# Patient Record
Sex: FEMALE | Race: BLACK OR AFRICAN AMERICAN | NOT HISPANIC OR LATINO | Employment: FULL TIME | URBAN - METROPOLITAN AREA
[De-identification: names, ages, dates, MRNs, and addresses within clinical notes are randomized per-mention and may not be internally consistent; named-entity substitution may affect disease eponyms.]

---

## 2017-06-19 ENCOUNTER — PRENATAL OFFICE VISIT (OUTPATIENT)
Dept: NURSING | Facility: CLINIC | Age: 22
End: 2017-06-19
Payer: COMMERCIAL

## 2017-06-19 VITALS
TEMPERATURE: 98 F | BODY MASS INDEX: 31.54 KG/M2 | HEIGHT: 62 IN | WEIGHT: 171.4 LBS | SYSTOLIC BLOOD PRESSURE: 119 MMHG | HEART RATE: 90 BPM | DIASTOLIC BLOOD PRESSURE: 71 MMHG

## 2017-06-19 DIAGNOSIS — Z34.90 SUPERVISION OF NORMAL PREGNANCY: Primary | ICD-10-CM

## 2017-06-19 PROBLEM — Z23 NEED FOR TDAP VACCINATION: Status: ACTIVE | Noted: 2017-06-19

## 2017-06-19 LAB
ABO + RH BLD: NORMAL
ABO + RH BLD: NORMAL
ALBUMIN UR-MCNC: NEGATIVE MG/DL
APPEARANCE UR: CLEAR
BILIRUB UR QL STRIP: NEGATIVE
BLD GP AB SCN SERPL QL: NORMAL
BLOOD BANK CMNT PATIENT-IMP: NORMAL
COLOR UR AUTO: YELLOW
ERYTHROCYTE [DISTWIDTH] IN BLOOD BY AUTOMATED COUNT: 12.3 % (ref 10–15)
GLUCOSE UR STRIP-MCNC: NEGATIVE MG/DL
HCT VFR BLD AUTO: 38.1 % (ref 35–47)
HGB BLD-MCNC: 12.8 G/DL (ref 11.7–15.7)
HGB UR QL STRIP: NEGATIVE
KETONES UR STRIP-MCNC: NEGATIVE MG/DL
LEUKOCYTE ESTERASE UR QL STRIP: NEGATIVE
MCH RBC QN AUTO: 29.9 PG (ref 26.5–33)
MCHC RBC AUTO-ENTMCNC: 33.6 G/DL (ref 31.5–36.5)
MCV RBC AUTO: 89 FL (ref 78–100)
NITRATE UR QL: NEGATIVE
PH UR STRIP: 5.5 PH (ref 5–7)
PLATELET # BLD AUTO: 303 10E9/L (ref 150–450)
RBC # BLD AUTO: 4.28 10E12/L (ref 3.8–5.2)
SP GR UR STRIP: 1.02 (ref 1–1.03)
SPECIMEN EXP DATE BLD: NORMAL
URN SPEC COLLECT METH UR: NORMAL
UROBILINOGEN UR STRIP-ACNC: 0.2 EU/DL (ref 0.2–1)
WBC # BLD AUTO: 7.2 10E9/L (ref 4–11)

## 2017-06-19 PROCEDURE — 87086 URINE CULTURE/COLONY COUNT: CPT | Performed by: ADVANCED PRACTICE MIDWIFE

## 2017-06-19 PROCEDURE — 86592 SYPHILIS TEST NON-TREP QUAL: CPT | Performed by: ADVANCED PRACTICE MIDWIFE

## 2017-06-19 PROCEDURE — 85027 COMPLETE CBC AUTOMATED: CPT | Performed by: ADVANCED PRACTICE MIDWIFE

## 2017-06-19 PROCEDURE — 99207 ZZC NO CHARGE NURSE ONLY: CPT

## 2017-06-19 PROCEDURE — 86762 RUBELLA ANTIBODY: CPT | Performed by: ADVANCED PRACTICE MIDWIFE

## 2017-06-19 PROCEDURE — 87340 HEPATITIS B SURFACE AG IA: CPT | Performed by: ADVANCED PRACTICE MIDWIFE

## 2017-06-19 PROCEDURE — 86901 BLOOD TYPING SEROLOGIC RH(D): CPT | Performed by: ADVANCED PRACTICE MIDWIFE

## 2017-06-19 PROCEDURE — 87389 HIV-1 AG W/HIV-1&-2 AB AG IA: CPT | Performed by: ADVANCED PRACTICE MIDWIFE

## 2017-06-19 PROCEDURE — 36415 COLL VENOUS BLD VENIPUNCTURE: CPT | Performed by: ADVANCED PRACTICE MIDWIFE

## 2017-06-19 PROCEDURE — 86900 BLOOD TYPING SEROLOGIC ABO: CPT | Performed by: ADVANCED PRACTICE MIDWIFE

## 2017-06-19 PROCEDURE — 86780 TREPONEMA PALLIDUM: CPT | Performed by: ADVANCED PRACTICE MIDWIFE

## 2017-06-19 PROCEDURE — 86593 SYPHILIS TEST NON-TREP QUANT: CPT | Performed by: ADVANCED PRACTICE MIDWIFE

## 2017-06-19 PROCEDURE — 81003 URINALYSIS AUTO W/O SCOPE: CPT | Performed by: ADVANCED PRACTICE MIDWIFE

## 2017-06-19 PROCEDURE — 86850 RBC ANTIBODY SCREEN: CPT | Performed by: ADVANCED PRACTICE MIDWIFE

## 2017-06-19 RX ORDER — PRENATAL VIT/IRON FUM/FOLIC AC 27MG-0.8MG
1 TABLET ORAL DAILY
Qty: 100 TABLET | Refills: 3 | Status: SHIPPED | OUTPATIENT
Start: 2017-06-19 | End: 2019-04-02

## 2017-06-19 RX ORDER — PRENATAL VIT/IRON FUM/FOLIC AC 27MG-0.8MG
1 TABLET ORAL DAILY
COMMUNITY
End: 2019-04-02

## 2017-06-19 NOTE — MR AVS SNAPSHOT
"              After Visit Summary   6/19/2017    Roberto Caro    MRN: [id number]           Patient Information     Date Of Birth          1995        Visit Information        Provider Department      6/19/2017 12:45 PM RD OB NURSE EDUCATION [facility]        Today's Diagnoses     Supervision of normal pregnancy    -  1       Follow-ups after your visit        Your next 10 appointments already scheduled     Jul 14, 2017  1:00 PM CDT   New Prenatal with SUZI Juarez CNM   [facility] ([facility])    [address] [place] [id number] [phone]              Who to contact     If you have questions or need follow up information about today's clinic visit or your schedule please contact [facility] directly at [phone].  Normal or non-critical lab and imaging results will be communicated to you by IndoorAtlashart, letter or phone within 4 business days after the clinic has received the results. If you do not hear from us within 7 days, please contact the clinic through MyChart or phone. If you have a critical or abnormal lab result, we will notify you by phone as soon as possible.  Submit refill requests through Nusocket or call your pharmacy and they will forward the refill request to us. Please allow 3 business days for your refill to be completed.          Additional Information About Your Visit        MyChart Information     Nusocket lets you send messages to your doctor, view your test results, renew your prescriptions, schedule appointments and more. To sign up, go to [email or web address] . Click on \"Log in\" on the left side of the screen, which will take you to the Welcome page. Then click on \"Sign up Now\" on the right side of the page.     You will be asked to enter the access code listed below, as well as some personal information. Please follow the directions to create your username and password.   " "  Your access code is: [email or web address]  Expires: 2017 12:53 PM     Your access code will  in 90 days. If you need help or a new code, please call your [facility] or [phone].        Care EveryWhere ID     This is your Care EveryWhere ID. This could be used by other organizations to access your [place] medical records  [id number]        Your Vitals Were     Pulse Temperature Height Last Period BMI (Body Mass Index)       90 98  F (36.7  C) 5' 1.5\" (1.562 m) 2017 31.86 kg/m2        Blood Pressure from Last 3 Encounters:   17 119/71   16 126/85   02/15/16 132/77    Weight from Last 3 Encounters:   17 171 lb 6.4 oz (77.7 kg)   16 168 lb 1.6 oz (76.2 kg)   02/15/16 146 lb 1.6 oz (66.3 kg)              We Performed the Following     ABO/RH Type and Screen     Anti Treponema     CBC with Platelets     Hepatitis B surface antigen     HIV Antigen Antibody Combo     Rubella Antibody IgG Quantitative     UA without Microscopic     Urine Culture Aerobic Bacterial        Primary Care Provider    Physician No Ref-Primary       No address on file        Goals        General    Psychosocial (pt-stated)     Notes - Note created  2015  2:44 PM by Mago Dickey LICSW    As of today's date 2015 goal is met at 0 - 25%.   Goal Status:  Active      Pt will contact [facility] for parenting support classes/baby needs          Thank you!     Thank you for choosing [facility]  for your care. Our goal is always to provide you with excellent care. Hearing back from our patients is one way we can continue to improve our services. Please take a few minutes to complete the written survey that you may receive in the mail after your visit with us. Thank you!             Your Updated Medication List - Protect others around you: Learn how to safely use, store and throw away your medicines at www.disposemymeds.org.          This list is accurate as of: 17 12:53 PM.  Always " use your most recent med list.                   Brand Name Dispense Instructions for use    prenatal multivitamin  plus iron 27-0.8 MG Tabs per tablet      Take 1 tablet by mouth daily

## 2017-06-19 NOTE — PROGRESS NOTES
Patient presents for new ob teaching and labs, third pregnancy. History of SIIDS.Declined firsttrimester screening, handouts reviewed and given. Positive pregnancy test 6/14/17 at [facility]. Has appointment with [facility] 7/14/17.      Prenatal OB Questionnaire  Past Medical History  Diabetes   No  Hypertension   No  Heart Disease, mitral valve prolapse, or rheumatic fever?   No  An autoimmune disorder such as Lupus or Rheumatoid Arthritis?   No  Kidney Disease or Urinary Tract Infection?   No  Epilepsy, seizures or spells?   No  Migraine headaches?   No  A stroke or loss of function or sensation?   No  Any other neurological problems?   No  Have you ever been treated for depression?  No  Are you having problems with crying spells or loss of self-esteem?   No  Have you ever required psychiatric care?   No  Have you ever hepatitis, liver disease or jaundice?   No  Have you ever been treated for blood clots in your veins, deep venous thrombosis, inflammation in the veins, thrombosis, phlebitis, pulmonary embolism or varicosities?   No  Have you had excessive bleeding after surgery or dental work?   No  Do you bleed more than other women after a cut or scratch?   No  Do you have a history of anemia?   No  Have you ever been treated for thyroid problems or taken thyroid medication?  No  Do you have any other endocrine problems?  No  Have you ever been in a major accident or suffered serious trauma?   No  Within the last year, has anyone hit slapped, kicked or otherwise hurt you?  No  In the last year, has anyone forced you to have sex when you didn't want to?  No  Have you ever had a blood transfusion?   No  Would you refuse a blood transfusion if a doctor judged it to be medically necessary?   No  If you answered yes, would you rather die than have a blood transfusion?   No  If you answered yes, is this for Buddhist reasons?   No  Does anyone in your home smoke?   No  Do you use tobacco products?  No  Do you drink  beer, wine, hard liquor?  No  Do you use any of the following: marijuana, speed, cocaine, heroine, hallucinogens, or other drugs?  No  Is your blood type Rh negative?   No  Have you ever had abnormal antibodies in your blood?   No  Have you ever had asthma?   No  Have you ever had tuberculosis?   No  Do you have any allergies to drugs or over-the-counter medications?   No    Allergies as of 6/19/2017:    Allergies as of 06/19/2017     (No Known Allergies)       Do you have any breast problems?   No  Have you ever ?   Yes  Have you had any gynecological surgical procedures such as cervical conization, a LEEP procedure, laser treatment, cryosurgery of the cervix, or a dilation and curettage, etc?  No  Have you had any other surgical procedures?  No  Have you been hospitalized for a nonsurgical reason excluding normal delivery?   No  Have you ever had any anesthetic complications?   No  Have you ever had an abnormal pap smear?   No  Do you have a history of abnormalities of the uterus?   No  Did it take you more than one year to become pregnant?   No  Have you ever been evaluated or treated for infertility?   No  Is there a history of medical problems in your family, which you feel might adversely affect your health or pregnancy?   No  Do you have any other problems we have not asked you about which you feel may be important to this pregnancy?  No    Symptoms since Last Menstrual Period  Do you have any of the following:    *abdominal pain  No  *blood in stool or urine  No  *chest pain  No  *shortness of breath  No  *coughing or vomiting up blood No  *heart racing or skipping beats  No  *nausea and vomiting  No  *pain with urination  No  *vaginal discharge or bleeding  No  Current medications are:  Current Outpatient Prescriptions   Medication Sig Dispense Refill     Prenatal Vit-Fe Fumarate-FA (PRENATAL MULTIVITAMIN  PLUS IRON) 27-0.8 MG TABS per tablet Take 1 tablet by mouth daily         Genetic  Screening  At the time of birth, will you be 35 years old or older?  No  Has the patient, baby s father, or anyone in either family had:  Thalassemia (Italian, Greek, Mediterranean, or  background only) and an MCV result less than 80?  No  Neural tube defect such as meningomyelocele, spina bifida or anencephaly?  No  Congenital heart defect?  No  Down s syndrome?  No  Lee-Sach s disease (Gnosticist, Cajun, Syriac-Humacao)?  No  Sickle cell disease or trait (Griselda)?  No  Hemophilia or other inherited problems of blood coagulation? No  Muscular dystrophy?  No  Cystic Fibrosis?  No  Curt s chorea?  No  Mental retardation/autism? No   If yes, was the person tested for fragile X?  No  Any other inherited genetic or chromosomal disorder?  No  Maternal metabolic disorder (e.g. insulin-dependent diabetes, PKU)? No  A child with birth defects not listed above?  No  Recurrent pregnancy loss or a stillbirth?  Yes  Has the patient had any medications/street drugs/alcohol since her last menstrual period? No  Does the patient or baby s father have any other genetic risks?  No  Infection History  Do you object to being tested for Hepatitis B? No  Do you object to being tested for HIV? No  Do you feel that you are at high risk for coming in contact with the AIDS virus?  No  Have you ever been treated for tuberculosis?  No  Have you ever received the BCG vaccine for tuberculosis?  No  Have you ever had a positive skin test for tuberculosis? No  Do you live with someone who has tuberculosis?  No  Have you ever been exposed to tuberculosis?  No  Do you have genital herpes?  No  Does your partner have genital herpes?  No  Have you had a rash or viral illness since your last period?  No  Have you ever had Gonorrhea, Chlamydia, Syphilis, venereal warts, trichomoniasis, pelvic inflammatory disease or any other sexually transmitted disease?  No  Do you know if you are a genital group B streptococcus carrier? No  You have not had  chicken pox/varicella  No  Have you been vaccinated against chicken pox?  No  Have you had any other infectious disease? No        Early ultrasound screening tool:    Does patient have irregular periods?  No  Did patient use hormonal birth control in the three months prior to positive urine pregnancy test? No  Is the patient breastfeeding?  No  Is the patient 10 weeks or greater at time of education visit?  No

## 2017-06-20 ENCOUNTER — TELEPHONE (OUTPATIENT)
Dept: MIDWIFE SERVICES | Facility: CLINIC | Age: 22
End: 2017-06-20

## 2017-06-20 LAB
BACTERIA SPEC CULT: NORMAL
HBV SURFACE AG SERPL QL IA: NONREACTIVE
HIV 1+2 AB+HIV1 P24 AG SERPL QL IA: NORMAL
MICRO REPORT STATUS: NORMAL
RPR SER QL: ABNORMAL
RPR SER-TITR: 1 {TITER}
RUBV IGG SERPL IA-ACNC: 21 IU/ML
SPECIMEN SOURCE: NORMAL
T PALLIDUM IGG+IGM SER QL: ABNORMAL

## 2017-06-20 NOTE — TELEPHONE ENCOUNTER
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 RPR titer is 1. Routing to CNM.   Rubina Poole RN-BSN

## 2017-07-11 ENCOUNTER — PRENATAL OFFICE VISIT (OUTPATIENT)
Dept: MIDWIFE SERVICES | Facility: CLINIC | Age: 22
End: 2017-07-11
Payer: COMMERCIAL

## 2017-07-11 VITALS
TEMPERATURE: 98 F | DIASTOLIC BLOOD PRESSURE: 79 MMHG | HEART RATE: 84 BPM | SYSTOLIC BLOOD PRESSURE: 128 MMHG | WEIGHT: 166 LBS | BODY MASS INDEX: 30.86 KG/M2

## 2017-07-11 DIAGNOSIS — Z20.2 SYPHILIS CONTACT, TREATED: ICD-10-CM

## 2017-07-11 DIAGNOSIS — O03.9 MISCARRIAGE: Primary | ICD-10-CM

## 2017-07-11 LAB — B-HCG SERPL-ACNC: 2 IU/L (ref 0–5)

## 2017-07-11 PROCEDURE — 84702 CHORIONIC GONADOTROPIN TEST: CPT | Performed by: ADVANCED PRACTICE MIDWIFE

## 2017-07-11 PROCEDURE — 99213 OFFICE O/P EST LOW 20 MIN: CPT | Performed by: ADVANCED PRACTICE MIDWIFE

## 2017-07-11 PROCEDURE — 36415 COLL VENOUS BLD VENIPUNCTURE: CPT | Performed by: ADVANCED PRACTICE MIDWIFE

## 2017-07-11 NOTE — PROGRESS NOTES
S:  Roberto Caro is a  who presents today for evaluation after miscarriage for possible pregnancy again. Patient reports she was seen in the ER at [facility] on 17 and 2017. She was diagnosed with a miscarriage on the  after having her beta HCG levels trending down and ultrasound showing no gestational sac. Patient had bleeding which resolved around 2017. She reports she has had unprotected intercourse since then and is still having some pregnancy symptoms. She feels like there is a possibility she might be pregnant again. Here today to get another pregnancy test done. We discussed it might be hard to tell if the beta levels are still present from the miscarriage or just beginning to rise with a new pregnancy. When the results return we will come up with a plan of either returning for further testing or taking a home pregnancy test in a couple of weeks.   At her new OB visit she had an RPR redrawn. Her RPR return weakly reactive which was new from non-reactive before. Her titer was 1. Per problem list patient was given referral for infectious disease but it appears she has not followed up. Discussed with patient it would be good to have infectious disease review her chart since they are the experts but most likely she will not need treatment again unless her titers increase. Patient is agreeable to having infectious disease referral. Referral given today.   No other questions or concerns.     /79  Pulse 84  Temp 98  F (36.7  C) (Oral)  Wt 166 lb (75.3 kg)  LMP 2017  BMI 30.86 kg/m2  Patient appears well     A:  Follow up miscarriage, possible new pregnancy    P:  Will call patient with beta HCG results and come up with a plan pending results.     Yanet Márquez CNM

## 2017-07-11 NOTE — MR AVS SNAPSHOT
After Visit Summary   7/11/2017    Roberto Caro    MRN: [id number]           Patient Information     Date Of Birth          1995        Visit Information        Provider Department      7/11/2017 2:00 PM Yanet Márquez APRN CNM [facility]        Today's Diagnoses     Miscarriage    -  1    Syphilis contact, treated           Follow-ups after your visit        Additional Services     INFECTIOUS DISEASE REFERRAL       Your provider has referred you to: [facility]: Adult Specialty [facility] Infusion [facility] [phone]   [email or web address]    Please be aware that coverage of these services is subject to the terms and limitations of your health insurance plan.  Call member services at your health plan with any benefit or coverage questions.      Please bring the following with you to your appointment:    (1) Any X-Rays, CTs or MRIs which have been performed.  Contact the facility where they were done to arrange for  prior to your scheduled appointment.    (2) List of current medications   (3) This referral request   (4) Any documents/labs given to you for this referral                  Who to contact     If you have questions or need follow up information about today's clinic visit or your schedule please contact [facility] directly at [phone].  Normal or non-critical lab and imaging results will be communicated to you by MyChart, letter or phone within 4 business days after the clinic has received the results. If you do not hear from us within 7 days, please contact the clinic through MyChart or phone. If you have a critical or abnormal lab result, we will notify you by phone as soon as possible.  Submit refill requests through [employer] or call your pharmacy and they will forward the refill request to us. Please allow 3 business days for your refill to be completed.           "Additional Information About Your Visit        MyChart Information     Govtoday lets you send messages to your doctor, view your test results, renew your prescriptions, schedule appointments and more. To sign up, go to [email or web address] . Click on \"Log in\" on the left side of the screen, which will take you to the Welcome page. Then click on \"Sign up Now\" on the right side of the page.     You will be asked to enter the access code listed below, as well as some personal information. Please follow the directions to create your username and password.     Your access code is: [email or web address]  Expires: 2017 12:53 PM     Your access code will  in 90 days. If you need help or a new code, please call your [place] clinic or [phone].        Care EveryWhere ID     This is your Care EveryWhere ID. This could be used by other organizations to access your [place] medical records  [id number]        Your Vitals Were     Pulse Temperature Last Period BMI (Body Mass Index)          84 98  F (36.7  C) (Oral) 2017 30.86 kg/m2         Blood Pressure from Last 3 Encounters:   17 128/79   17 119/71   16 126/85    Weight from Last 3 Encounters:   17 166 lb (75.3 kg)   17 171 lb 6.4 oz (77.7 kg)   16 168 lb 1.6 oz (76.2 kg)              We Performed the Following     HCG, quantitative, pregnancy     INFECTIOUS DISEASE REFERRAL        Primary Care Provider    Physician No Ref-Primary       No address on file        Goals        General    Psychosocial (pt-stated)     Notes - Note created  2015  2:44 PM by Mago Dickey LICSW    As of today's date 2015 goal is met at 0 - 25%.   Goal Status:  Active      Pt will contact [facility] for parenting support classes/baby needs          Equal Access to Services     LAURITA STEPHENS AH: Madi Seals, froylan gil, crow kaalreginaldo prasad. So wa " [phone].    ATENCIÓN: Si landen gallardo, tiene a bob disposición servicios gratuitos de asistencia lingüística. Poonam al [phone].    We comply with applicable federal civil rights laws and [place] laws. We do not discriminate on the basis of race, color, national origin, age, disability sex, sexual orientation or gender identity.            Thank you!     Thank you for choosing [facility]  for your care. Our goal is always to provide you with excellent care. Hearing back from our patients is one way we can continue to improve our services. Please take a few minutes to complete the written survey that you may receive in the mail after your visit with us. Thank you!             Your Updated Medication List - Protect others around you: Learn how to safely use, store and throw away your medicines at www.disposemymeds.org.          This list is accurate as of: 7/11/17  2:47 PM.  Always use your most recent med list.                   Brand Name Dispense Instructions for use Diagnosis    * prenatal multivitamin  plus iron 27-0.8 MG Tabs per tablet      Take 1 tablet by mouth daily        * prenatal multivitamin  plus iron 27-0.8 MG Tabs per tablet     100 tablet    Take 1 tablet by mouth daily    Supervision of normal pregnancy       * Notice:  This list has 2 medication(s) that are the same as other medications prescribed for you. Read the directions carefully, and ask your doctor or other care provider to review them with you.

## 2017-07-11 NOTE — NURSING NOTE
"Chief Complaint   Patient presents with     Prenatal Care       Initial /79  Pulse 84  Temp 98  F (36.7  C) (Oral)  Wt 166 lb (75.3 kg)  LMP 2017  BMI 30.86 kg/m2 Estimated body mass index is 30.86 kg/(m^2) as calculated from the following:    Height as of 17: 5' 1.5\" (1.562 m).    Weight as of this encounter: 166 lb (75.3 kg).  BP completed using cuff size: regular        The following HM Due: NONE      The following patient reported/Care Every where data was sent to:  P ABSTRACT QUALITY INITIATIVES [94946]  na     n/a             "

## 2018-04-19 ENCOUNTER — TELEPHONE (OUTPATIENT)
Dept: TRANSPLANT | Facility: CLINIC | Age: 23
End: 2018-04-19

## 2018-04-19 DIAGNOSIS — Z00.5 TRANSPLANT DONOR EVALUATION: Primary | ICD-10-CM

## 2018-04-19 NOTE — TELEPHONE ENCOUNTER
"[facility] BREEZE  [id number]      LIVING KIDNEY DONOR EVALUATION  Donor First Name kiet Donor MRN    Donor Last Name ruben Completed 2018 5:08 PM    1995 Record ID [id number]   BREEZE Screen PASSED     Intended Recipient  Recipient First Name pema Recipient MRN    Recipient Last Name ruben Relationship Parent   Recipient  1976 Recipient Diagnosis    Recipient's ABO      Donor Information  Age 23 Gender Female   Height 5' 2'' Race Black or    Weight 167 Ethnicity Not /   BMI 30.6 Preferred Language English      Required No     Blood Type O   Demographics  Home Address [address] [place] [address] # [phone]   [facility] Type [facility] Alternate #    [facility] Code [address] Type    Country [place] Preferred Contact day Mon, Fri, Thur, Wed, Tue   Email [email or web address] Preferred Contact time 11:00 AM-1:00 PM, 1:00 PM-4:00 PM   Donor's Medical Information  Medical History History of miscarriage   History of pregnancy Medications None Reported   Surgical History Tonsillectomy Allergies NKDA   Social History EtOH: Rare (1-2 drinks/year)   Illicit Drug Use: Denies   Tobacco: Denies Self-Reported Functional Status \"I am able to climb 2 flights of stairs without stopping\"   Family Medical History Cancer (denies)   Diabetes (Aunt or Uncle, Grandparent)   Heart Disease (denies)   Hypertension (Mother, Grandparent, Aunt or Uncle)   Kidney Disease (denies)   Kidney Stones (denies) Exercise Frequency Exercise (Not on a regular basis)   Review of Organ Systems  Review of Systems Airway or Lungs: No   Blood Disorder: No   Cancer: No   Diabetes,Thyroid,Adrenal,Endocrine Disorder: No   Digestive or Liver: No   Female Health: Yes   Heart or Circulatory System: No   Immune Diseases: No   Kidneys and Bladder: No   Muscles,Bones,Joints: No   Neuro: No   Psych: No   Donor's Social Information  Marital Status Single Living Accommodation " Lives in rented accommodation   Level of Education Some college education Living Arrangement Other   Employment Status Full Time Concerns: health and life insurance No   Employer [employer] Concerns: job security and lost income No   Occupation      Medical Insurance Status No medical insurance     High Risk Behavior  High Risk Behaviors Blood transfusion < 12 months. (NO)   Commercial sex < 12 months. (NO)   Illicit IV drug use < 5yrs. (NO)   Other high risk sexual contact < 12 months. (NO)   Reason for Donation  Referral Friend or Family of [place]x Candidate Reason for Donation my dad need kidney I want help him   Permission to Disclose Inquiry Yes Patient Comments    Donor Motivation Level Highly motivated donor     PCP Contact  PCP Name    PCP [facility]    PCP [facility]   PCP Phone    Emergency Contact  First Name eunice First Name    Last Name suzie Last Name    Phone # [phone] Phone #    Phone Type Mobile Phone Type    Relationship Mother Relationship    Office Use  Reviewed By Lilian Douglass 4/19/2018 8:55 AM   Admin Folder Accept   Comments 4-19: Passed.   Lost for Followup    Extended Comments    BREEZE ID [email or web address]:[email or web address].[id number] survey status completed   Activity History  Call  Task    Due Date 4/19/2018   Last Modified Date/Time 4/19/2018 12:01 PM   Comments    Call  Task    Due Date 4/19/2018   Last Modified Date/Time 4/19/2018 11:39 AM   Comments

## 2019-04-02 ENCOUNTER — PRENATAL OFFICE VISIT (OUTPATIENT)
Dept: NURSING | Facility: CLINIC | Age: 24
End: 2019-04-02
Payer: COMMERCIAL

## 2019-04-02 VITALS
BODY MASS INDEX: 33.34 KG/M2 | HEIGHT: 62 IN | DIASTOLIC BLOOD PRESSURE: 78 MMHG | SYSTOLIC BLOOD PRESSURE: 124 MMHG | TEMPERATURE: 98 F | WEIGHT: 181.2 LBS | HEART RATE: 102 BPM

## 2019-04-02 DIAGNOSIS — Z34.90 SUPERVISION OF NORMAL PREGNANCY: Primary | ICD-10-CM

## 2019-04-02 LAB
ABO + RH BLD: NORMAL
ABO + RH BLD: NORMAL
ALBUMIN UR-MCNC: NEGATIVE MG/DL
APPEARANCE UR: CLEAR
B-HCG SERPL-ACNC: ABNORMAL IU/L (ref 0–5)
BILIRUB UR QL STRIP: NEGATIVE
BLD GP AB SCN SERPL QL: NORMAL
BLOOD BANK CMNT PATIENT-IMP: NORMAL
COLOR UR AUTO: YELLOW
ERYTHROCYTE [DISTWIDTH] IN BLOOD BY AUTOMATED COUNT: 13.2 % (ref 10–15)
GLUCOSE UR STRIP-MCNC: NEGATIVE MG/DL
HCG UR QL: POSITIVE
HCT VFR BLD AUTO: 39.3 % (ref 35–47)
HGB BLD-MCNC: 13.4 G/DL (ref 11.7–15.7)
HGB UR QL STRIP: NEGATIVE
KETONES UR STRIP-MCNC: NEGATIVE MG/DL
LEUKOCYTE ESTERASE UR QL STRIP: NEGATIVE
MCH RBC QN AUTO: 29.8 PG (ref 26.5–33)
MCHC RBC AUTO-ENTMCNC: 34.1 G/DL (ref 31.5–36.5)
MCV RBC AUTO: 88 FL (ref 78–100)
NITRATE UR QL: NEGATIVE
PH UR STRIP: 8 PH (ref 5–7)
PLATELET # BLD AUTO: 327 10E9/L (ref 150–450)
RBC # BLD AUTO: 4.49 10E12/L (ref 3.8–5.2)
SOURCE: ABNORMAL
SP GR UR STRIP: 1.01 (ref 1–1.03)
SPECIMEN EXP DATE BLD: NORMAL
UROBILINOGEN UR STRIP-ACNC: 0.2 EU/DL (ref 0.2–1)
WBC # BLD AUTO: 7.7 10E9/L (ref 4–11)

## 2019-04-02 PROCEDURE — 84702 CHORIONIC GONADOTROPIN TEST: CPT | Performed by: ADVANCED PRACTICE MIDWIFE

## 2019-04-02 PROCEDURE — 99207 ZZC NO CHARGE NURSE ONLY: CPT

## 2019-04-02 PROCEDURE — 86900 BLOOD TYPING SEROLOGIC ABO: CPT | Performed by: ADVANCED PRACTICE MIDWIFE

## 2019-04-02 PROCEDURE — 87086 URINE CULTURE/COLONY COUNT: CPT | Performed by: ADVANCED PRACTICE MIDWIFE

## 2019-04-02 PROCEDURE — 86762 RUBELLA ANTIBODY: CPT | Performed by: ADVANCED PRACTICE MIDWIFE

## 2019-04-02 PROCEDURE — 85027 COMPLETE CBC AUTOMATED: CPT | Performed by: ADVANCED PRACTICE MIDWIFE

## 2019-04-02 PROCEDURE — 81025 URINE PREGNANCY TEST: CPT | Performed by: ADVANCED PRACTICE MIDWIFE

## 2019-04-02 PROCEDURE — 87389 HIV-1 AG W/HIV-1&-2 AB AG IA: CPT | Performed by: ADVANCED PRACTICE MIDWIFE

## 2019-04-02 PROCEDURE — 86901 BLOOD TYPING SEROLOGIC RH(D): CPT | Performed by: ADVANCED PRACTICE MIDWIFE

## 2019-04-02 PROCEDURE — 36415 COLL VENOUS BLD VENIPUNCTURE: CPT | Performed by: ADVANCED PRACTICE MIDWIFE

## 2019-04-02 PROCEDURE — 87340 HEPATITIS B SURFACE AG IA: CPT | Performed by: ADVANCED PRACTICE MIDWIFE

## 2019-04-02 PROCEDURE — 86850 RBC ANTIBODY SCREEN: CPT | Performed by: ADVANCED PRACTICE MIDWIFE

## 2019-04-02 PROCEDURE — 0064U ANTB TP TOTAL&RPR IA QUAL: CPT | Performed by: ADVANCED PRACTICE MIDWIFE

## 2019-04-02 PROCEDURE — 86780 TREPONEMA PALLIDUM: CPT | Mod: 90 | Performed by: ADVANCED PRACTICE MIDWIFE

## 2019-04-02 PROCEDURE — 86592 SYPHILIS TEST NON-TREP QUAL: CPT | Performed by: ADVANCED PRACTICE MIDWIFE

## 2019-04-02 PROCEDURE — 99000 SPECIMEN HANDLING OFFICE-LAB: CPT | Performed by: ADVANCED PRACTICE MIDWIFE

## 2019-04-02 PROCEDURE — 81003 URINALYSIS AUTO W/O SCOPE: CPT | Performed by: ADVANCED PRACTICE MIDWIFE

## 2019-04-02 RX ORDER — PNV NO.95/FERROUS FUM/FOLIC AC 28MG-0.8MG
1 TABLET ORAL
COMMUNITY
Start: 2019-03-19

## 2019-04-02 SDOH — HEALTH STABILITY: PHYSICAL HEALTH: ON AVERAGE, HOW MANY DAYS PER WEEK DO YOU ENGAGE IN MODERATE TO STRENUOUS EXERCISE (LIKE A BRISK WALK)?: 0 DAYS

## 2019-04-02 SDOH — HEALTH STABILITY: PHYSICAL HEALTH: ON AVERAGE, HOW MANY MINUTES DO YOU ENGAGE IN EXERCISE AT THIS LEVEL?: 0 MIN

## 2019-04-02 SDOH — HEALTH STABILITY: MENTAL HEALTH
STRESS IS WHEN SOMEONE FEELS TENSE, NERVOUS, ANXIOUS, OR CAN'T SLEEP AT NIGHT BECAUSE THEIR MIND IS TROUBLED. HOW STRESSED ARE YOU?: NOT AT ALL

## 2019-04-02 ASSESSMENT — MIFFLIN-ST. JEOR: SCORE: 1517.23

## 2019-04-02 NOTE — PROGRESS NOTES
Important Information for Provider:       Patient presents for new ob teaching and labs, sixth pregnancy. History of TAB at 16 weeks 2018, 2 SAB's, first baby  at 1 month old of pneumonia/ SIIDS 11. She has one living child born 2016  with no complications. [place] [facility] [place], for cr[place], ultrasound was performed, too early. Blood work was done at that time. Repeat ultrasound scheduled 4/10/19 with appointment with CNM to review results. Handouts reviewed and given. Has NOB with CNM 19.  Patient has gained 20 lbs since ED visit on 3/19/19, dicussed diet and exercise. Need to double check weight at ultrasound appointment        Caffeine intake/servings daily - 2 pops daily, encouragaed more water intake  Calcium intake/servings daily - 3  Exercise 7 times weekly - describe ; walks daily  Sunscreen used - Yes  Seatbelts used - Yes  Guns stored in the home - No  Self Breast Exam - No  Pap test up to date -  Yes  Eye exam up to date -  Yes  Dental exam up to date -  Yes  DEXA scan up to date -  No  Flex Sig/Colonoscopy up to date -  No  Mammography up to date -  No  Immunizations reviewed and up to date - Yes  Abuse: Current or Past (Physical, Sexual or Emotional) - No  Do you feel safe in your environment - Yes  Do you cope well with stress - Yes  Do you suffer from insomnia - sometimes      Prenatal OB Questionnaire  Patient supplied answers from flow sheet for:  Prenatal OB Questionnaire.  Past Medical History  Diabetes?: No  Hypertension : No  Heart disease, mitral valve prolapse or rheumatic fever?: No  An autoimmune disease such as lupus or rheumatoid arthritis?: No  Kidney disease or urinary tract infection?: No  Epilepsy, seizures or spells?: No  Migraine headaches?: No  A stroke or loss of function or sensation?: No  Any other neurological problems?: No  Have you ever been treated for depression?: (!) Yes  Are you having problems with crying spells or loss  of self-esteem?: No  Have you ever required psychiatric care?: No  Have you ever had hepatitis, liver disease or jaundice?: No  Have you been treated for blood clots in your veins, deep vein thrombosis, inflammation in the veins, thrombosis, phlebitis, pulmonary embolism or varicosities?: No  Have you had excessive bleeding after surgery or dental work?: No  Do you bleed more than other women after a cut or scratch?: No  Do you have a history of anemia?: No  Have you ever had thyroid problems or taken thyroid medication?: No   Do you have any endocrine problems?: No  Have you ever been in a major accident or suffered serious trauma?: No  Within the last year, has anyone hit, slapped, kicked or otherwise hurt you?: No  In the last year, has anyone forced you to have sex when you didn't want to?: No    Past Medical History 2   Have you ever received a blood transfusion?: No  Would you refuse a blood transfusion if a doctor judged it to be medically necessary?: No   If you answered Yes, would you rather die than receive a blood transfusion?: No  If you answered Yes, is this for Latter-day reasons?: No  Does anyone in your home smoke?: No  Do you use tobacco products?: No  Do you drink beer, wine or hard liquor?: No  Do you use any of the following: marijuana, speed, cocaine, heroin, hallucinogens or other drugs?: No   Is your blood type Rh negative?: (!) Yes  Have you ever had abnormal antibodies in your blood?: No  Have you ever had asthma?: (!) Yes  Have you ever had tuberculosis?: No  Do you have any allergies to drugs or over-the-counter medications?: No  Allergies: Dust Mites, Aspartame, Ethanol, Venlafaxine, Hydrochloride, Sertraline: No  Have you had any breast problems?: No  Have you ever ?: (!) Yes  Have you had any gynecological surgical procedures such as cervical conization, a LEEP procedure, laser treatment, cryosurgery of the cervix or a dilation and curettage, etc?: No  Have you ever had any  other surgical procedures?: No  Have you been hospitalized for a nonsurgical reason excluding normal delivery?: No  Have you ever had any anesthetic complications?: No  Have you ever had an abnormal pap smear?: No    Past Medical History (Continued)  Do you have a history of abnormalities of the uterus?: No  Did your mother take SHAYLA or any other hormones when she was pregnant with you?: No  Did it take you more than a year to become pregnant?: No  Have you ever been evaluated or treated for infertility?: No  Is there a history of medical problems in your family, which you feel may be important to this pregnancy?: No  Do you have any other problems we have not asked about which you feel may be important to this pregnancy?: No    Symptoms since last menstrual period  Do you have any of the following symptoms: abdominal pain, blood in stools or urine, chest pain, shortness of breath, coughing or vomiting up blood, your heart racing or skipping beats, nausea and vomiting, pain on urination or vaginal discharge or bleed: (!) Yes  Will the patient be 35 years old or older at the time of delivery?: No    Has the patient, baby's father or anyone in either family had:  Thalassemia (Italian, Greek, Mediterranean or  background only) and an MCV result less than 80?: No  Neural tube defect such as meningomyelocele, spina bifida or anencephaly?: No  Congenital heart defect?: No  Down's Syndrome?: No  Lee-Sachs disease (Mormon, Cajun, Arabic-[place]and[place])?: No  Sickle cell disease or trait ()?: No  Hemophilia or other inherited problems of blood?: No  Muscular dystrophy?: No  Cystic fibrosis?: No  M[place]'s chorea?: No  Mental retardation/autism?: No  If yes, was the person tested for fragile X?: No  Any other inherited genetic or chromosomal disorder?: No  Maternal metabolic disorder (e.g Insulin-dependent diabetes, PKU)?: No  A child with birth defects not listed above?: No  Recurrent pregnancy loss or stillbirth?:  No   Has the patient had any medications/street drugs/alcohol since her last menstrual period?: No  Does the patient or baby's father have any other genetic risks?: No    Infection History   Do you object to being tested for Hepatitis B?: No  Do you object to being tested for HIV?: No   Do you feel that you are at high risk for coming in contact with the AIDS virus?: No  Have you ever been treated for tuberculosis?: No  Have you ever had a positive skin test for tuberculosis?: No  Do you live with someone who has tuberculosis?: No  Have you ever been exposed to tuberculosis?: No  Do you have genital herpes?: No  Does your partner have genital herpes?: No  Have you had a viral illness since your last period?: No  Have you ever had gonorrhea, chlamydia, syphilis, venereal warts, trichomoniasis, pelvic inflammatory disease or any other sexually transmitted disease?: (!) Yes  Do you know if you are a genital group B streptococcus carrier?: (!) Yes  Have you had chicken pox/varicella?: (!) Yes   Have you been vaccinated against chicken Pox?: (!) Yes  Have you had any other infectious diseases?: No      Allergies as of 4/2/2019:    Allergies as of 04/02/2019     (No Known Allergies)       Current medications are:  Current Outpatient Medications   Medication Sig Dispense Refill     Prenatal Vit-Fe Fumarate-FA (PRENATAL VITAMIN) 27-0.8 MG TABS Take 1 tablet by mouth           Early ultrasound screening tool:    Does patient have irregular periods?  No  Did patient use hormonal birth control in the three months prior to positive urine pregnancy test? No  Is the patient breastfeeding?  No  Is the patient 10 weeks or greater at time of education visit?  No

## 2019-04-03 ENCOUNTER — TELEPHONE (OUTPATIENT)
Dept: MIDWIFE SERVICES | Facility: CLINIC | Age: 24
End: 2019-04-03

## 2019-04-03 LAB
BACTERIA SPEC CULT: NORMAL
HBV SURFACE AG SERPL QL IA: NONREACTIVE
HIV 1+2 AB+HIV1 P24 AG SERPL QL IA: NONREACTIVE
RPR SER QL: NONREACTIVE
RUBV IGG SERPL IA-ACNC: 17 IU/ML
SPECIMEN SOURCE: NORMAL
T PALLIDUM AB SER QL: REACTIVE

## 2019-04-03 NOTE — TELEPHONE ENCOUNTER
Call from chem lab regarding patient's lab result. Treponema antibody is reactive, RPR is non-reactive. Routing to MB as FYI.   Rubina Poole RN-BSN

## 2019-04-06 LAB
RPR SER QL: NONREACTIVE
T PALLIDUM AB SER QL AGGL: REACTIVE

## 2019-04-10 ENCOUNTER — ANCILLARY PROCEDURE (OUTPATIENT)
Dept: ULTRASOUND IMAGING | Facility: CLINIC | Age: 24
End: 2019-04-10
Attending: ADVANCED PRACTICE MIDWIFE
Payer: COMMERCIAL

## 2019-04-10 ENCOUNTER — OFFICE VISIT (OUTPATIENT)
Dept: MIDWIFE SERVICES | Facility: CLINIC | Age: 24
End: 2019-04-10
Payer: COMMERCIAL

## 2019-04-10 VITALS
DIASTOLIC BLOOD PRESSURE: 76 MMHG | SYSTOLIC BLOOD PRESSURE: 132 MMHG | BODY MASS INDEX: 33.83 KG/M2 | TEMPERATURE: 98.8 F | WEIGHT: 182 LBS | HEART RATE: 94 BPM

## 2019-04-10 DIAGNOSIS — Z34.90 SUPERVISION OF NORMAL PREGNANCY: ICD-10-CM

## 2019-04-10 DIAGNOSIS — Z34.81 ENCOUNTER FOR SUPERVISION OF OTHER NORMAL PREGNANCY IN FIRST TRIMESTER: Primary | ICD-10-CM

## 2019-04-10 PROBLEM — Z34.80 NORMAL PREGNANCY IN MULTIGRAVIDA: Status: ACTIVE | Noted: 2019-04-10

## 2019-04-10 PROCEDURE — 76817 TRANSVAGINAL US OBSTETRIC: CPT | Performed by: OBSTETRICS & GYNECOLOGY

## 2019-04-10 PROCEDURE — 99212 OFFICE O/P EST SF 10 MIN: CPT | Performed by: ADVANCED PRACTICE MIDWIFE

## 2019-04-10 NOTE — PROGRESS NOTES
S:  Roberto Caro is a 24 year old  who presents today for follow up ultrasound. She has a history of miscarriage and was concerned about the pregnancy. Ultrasound today showed IUP at 7w4d. Her LMP was 2019 which is 5 days off from ultrasound. We discussed she is on the edge of changing EDC based on ultrasound. She feels okay about her dating but would prefer to use the ultrasound dating. Dating based on ultrasound. She has follow up scheduled for her new OB visit. No other questions or concerns today.   In her chart transplant is highlighted. She was seen by the transplant team to see if she could be a kidney donor for her father but she was not able to due to pregnancy. Her father is now on the kidney transplant list and waiting for a donor.     O:  /76   Pulse 94   Temp 98.8  F (37.1  C) (Oral)   Wt 82.6 kg (182 lb)   LMP 2019   BMI 33.83 kg/m    Doing well.     A:  IUP at 7w4d    P:  Follow up as scheduled for new OB visit. Already aware of her positive syphilis testing. RPR negative.     Yanet Márquez CNM

## 2019-04-24 ENCOUNTER — PRENATAL OFFICE VISIT (OUTPATIENT)
Dept: MIDWIFE SERVICES | Facility: CLINIC | Age: 24
End: 2019-04-24
Payer: COMMERCIAL

## 2019-04-24 VITALS
HEIGHT: 62 IN | BODY MASS INDEX: 33.69 KG/M2 | SYSTOLIC BLOOD PRESSURE: 138 MMHG | WEIGHT: 183.1 LBS | DIASTOLIC BLOOD PRESSURE: 83 MMHG | HEART RATE: 121 BPM | OXYGEN SATURATION: 98 %

## 2019-04-24 DIAGNOSIS — Z11.3 SCREENING FOR STD (SEXUALLY TRANSMITTED DISEASE): ICD-10-CM

## 2019-04-24 DIAGNOSIS — Z12.4 SCREENING FOR CERVICAL CANCER: Primary | ICD-10-CM

## 2019-04-24 DIAGNOSIS — Z67.41 TYPE O BLOOD, RH NEGATIVE: ICD-10-CM

## 2019-04-24 DIAGNOSIS — Z34.81 NORMAL PREGNANCY IN MULTIGRAVIDA IN FIRST TRIMESTER: ICD-10-CM

## 2019-04-24 DIAGNOSIS — B00.9 HSV (HERPES SIMPLEX VIRUS) INFECTION: ICD-10-CM

## 2019-04-24 PROCEDURE — 87591 N.GONORRHOEAE DNA AMP PROB: CPT | Performed by: ADVANCED PRACTICE MIDWIFE

## 2019-04-24 PROCEDURE — G0145 SCR C/V CYTO,THINLAYER,RESCR: HCPCS | Performed by: ADVANCED PRACTICE MIDWIFE

## 2019-04-24 PROCEDURE — 87491 CHLMYD TRACH DNA AMP PROBE: CPT | Performed by: ADVANCED PRACTICE MIDWIFE

## 2019-04-24 PROCEDURE — 99207 ZZC FIRST OB VISIT: CPT | Performed by: ADVANCED PRACTICE MIDWIFE

## 2019-04-24 RX ORDER — VALACYCLOVIR HYDROCHLORIDE 500 MG/1
500 TABLET, FILM COATED ORAL 2 TIMES DAILY
Qty: 6 TABLET | Refills: 3 | Status: SHIPPED | OUTPATIENT
Start: 2019-04-24 | End: 2019-10-22

## 2019-04-24 ASSESSMENT — MIFFLIN-ST. JEOR: SCORE: 1525.85

## 2019-04-24 NOTE — LETTER
April 30, 2019      Roberto Caro  [address]  [facility] [address]    Dear ,      I am happy to inform you that your recent cervical cancer screening test (PAP smear) was normal.      Preventative screenings such as this help to ensure your health for years to come. You should repeat a pap smear in 3 years, unless otherwise directed.      You will still need to return to the clinic every year for your annual exam and other preventive tests.     If you have additional questions regarding this result, please call our registered nurse, Pamella at [phone].      Sincerely,      SUZI Sheikh [facility]

## 2019-04-24 NOTE — PROGRESS NOTES
"9w4d  Est CNM patient.   FOB:  Alexander.  Discussed U/S and will change LORENA; 19.  Alexander 1st baby.  Pt nervous because of SAB last visit.   Has been diagnosed with HSV, discussed prophylasis the last month of pregnancy.  Pt informed that if  Outbreak of herpes occurs when delivering would mean a C/S. Pt ;breastfeed last baby for several months without diffculty.  FHT heard for short duration, bedsiide U/S for patient anxiety.  Pt has hx of syphilis and was adequately treated in . Watched titers with last pregnancy in  and titers did not go up, may need to do that again but will await test results. Reviewed warning signs of miscarriage, pt nervous because feels crampy sometimes, reviewed that normal.   rtc in 4 weeks but may return in 2 weeks to hear FHT\"s if nervous.  radames Caro is a  single   6 para 2 0 3 1  with a LMP of of 19 giving an EDC by ultrasound of 19 who presents for a new OB Visit.  The first positive pregnancy test was obtained on unknown .  Conception date is unknown.  She has not had bleeding since her LMP.  She has had mild nausea.. Weigh loss   has not occurred.. She denies fever, chills and viral infections since her last LMP.  She stopped taking  (medications) when she suspected pregnancy.  There is mildfatigue.  This was not a planned pregnancy.  She is a previous CNM patient.  FOB is Alexander, his first, their first together. He lives with her. .  =========================================    INFECTION HISTORY  HIV: no  Hepatitis B: no  Hepatitis C: no  Tuberculosis: no  Last PPD   Herpes self: yes  Herpes partner:  no and at risk  Chlamydia:  no and at risk  Gonorrhea:  no and at risk  HPV: no and at risk  BV:  no  Trichomonis:  no and at risk  Syphilis: yest treated in   Chicken Pox:  no  ============================================    PERSONAL/SOCIAL HISTORY  Lives lives with their family.  Employment: Full time.  Her job " involves moderate activity and long periods of standing with little potential for toxic exposure.  Additional items: None  =============================================    REVIEW OF SYSTEMS  CONSTITUTIONAL: Denies fever, chills and night sweats  DIET: Appetite is decrease.  Eats a regular.  Caffeine intake daily is 1-2.    Plans to gain 20 pounds with her pregnancy.  SKIN: Denies changes and suspicious moles or lesions.  ENT: Denies blurred vision, hearing loss, tinnitus, frequent colds, epistaxis, hoarseness and tooth pain.    ENDOCRINE: Denies heat and cold intolerance, and polydypsia.    BREASTS:  Intermittent tenderness since LMP.  Denies discharge and lumps.   HEART/LUNGS: Denies dyspnea, wheezing, coughing and chest pain.  HEMATOLOGIC: Denies tendency to bruise and history of blood clots.  GASTROINTESTINAL: Denies heartburn, indigestion and change of color in stools.    GENITOURINARY:  Denies urgency, dysuria and hematuria.  Has frequency of urination since LMP.   NEUROLOGIC:  Denies seizures, weakness and fainting.  PSYCHIATRIC:  Denies depression or anxiety  ===========================================    PHYSICAL EXAM  GENERAL:   pleasant pregnant female, alert, cooperative  and well groomed.  SKIN:  Warm and dry, without lesions or tenderness.    HEAD: Symmetrical features.  EYES:  PERRLA, wears no corective lenses.  EARS: Tympanic membranes gray, translucent and intact.  NOSE: No flaring, patent  MOUTH:  Buccal mucosa pink, moist without lesions.  Teeth in good repair.    NECK:  Thyroid without enlargement and nodules.  Lymph nodes not palpable.   LUNGS:  Clear to auscultation.  BREAST:  Symmetrical without lesions or nodes.  Nipples everted.  Areolas symmetric.  No palpable axillary nodes.  HEART:  RRR without murmur.  ABDOMEN: Soft without masses or tenderness.  No CVA tenderness.  Uterus palpable at size equal to dates.    .No scars noted..  MUSCULOSKELETAL:  Full range of motion  GENITALIA: Secondary  genital hair growth pattern is in a normal female distribution.  Bartholin and [place] glands without tenderness and inflammation.  Perineum without lesions.  VAGINA:  Pink, normal ruga and discharge.  CERVIX:  Anterior, smooth, without discharge or CMT and parous os, firm/ closed 4 cm long.  UTERUS: Anteverted, nontender 10 weeks in size.  ADNEXA: Without masses or tenderness  RECTAL:  Normal appearance.  Digital exam deferred.  PELVIS:  Arch; wide . Sacrum; deep. Spines;blunt.  Side walls; straight. Type; gynecoid  Pelvis proven to 7 pounds 4 ounces.  EXTREMITIES:  2+/2+ DTR, No edema. No significant varicosities.  ===================================================    PLAN:  Instructed on use of triage nurse line and contacting the on call CNM after hours in an emergency.    Discussed the indications, uses for and false positives for quad screen and fetal survey ultrasounds at 18-20 weeks gestation.  Will inform us at the next visit if she wished to avail herself of these screens.  Will return to the clinic in 4 weeks for her next routine prenatal check.  Will call to be seen sooner if problem arise.  Discussed the risks, benefits, side effects and alternative therapies for current prescribed and OTC medications.

## 2019-04-24 NOTE — LETTER
[facility]  [address] [place] [address]  [facility] [address]  [phone]      April 24, 2019      Roberto Caro  [address]  [facility] [address]              To Whom It May Concern:    Roberto Caro is being seen in our clinic for prenatal care.  Her Estimated Date of Delivery: Nov 23, 2019.  Ms. Caro should have 2- 15 minutes breaks and 1 - 30 minute break in an 8 hour shift.  Ms. Caro should not lift more than 25 lbs by herself starting now.    If any questions please call [phone].       Sincerely,        Geraldine Chandler CNM

## 2019-04-24 NOTE — PROGRESS NOTES
"Chief Complaint   Patient presents with     Prenatal Care     9+4.       Initial /83 (BP Location: Left arm, Patient Position: Sitting, Cuff Size: Adult Regular)   Pulse 121   Ht 1.562 m (5' 1.5\")   Wt 83.1 kg (183 lb 1.6 oz)   LMP 2019   SpO2 98%   BMI 34.04 kg/m   Estimated body mass index is 34.04 kg/m  as calculated from the following:    Height as of this encounter: 1.562 m (5' 1.5\").    Weight as of this encounter: 83.1 kg (183 lb 1.6 oz).  BP completed using cuff size: regular    Questioned patient about current smoking habits.  Pt. has never smoked.          The following HM Due: pap smear  Annalisa (13-24)      The following patient reported/Care Every where data was sent to:  P ABSTRACT QUALITY INITIATIVES [03917]  n/a      patient has appointment for today and orders have been placed              "

## 2019-04-25 LAB
C TRACH DNA SPEC QL NAA+PROBE: NEGATIVE
N GONORRHOEA DNA SPEC QL NAA+PROBE: NEGATIVE
SPECIMEN SOURCE: NORMAL
SPECIMEN SOURCE: NORMAL

## 2019-04-27 LAB
COPATH REPORT: NORMAL
PAP: NORMAL

## 2019-05-24 ENCOUNTER — PRENATAL OFFICE VISIT (OUTPATIENT)
Dept: MIDWIFE SERVICES | Facility: CLINIC | Age: 24
End: 2019-05-24
Payer: COMMERCIAL

## 2019-05-24 VITALS
OXYGEN SATURATION: 100 % | HEART RATE: 95 BPM | TEMPERATURE: 98.6 F | DIASTOLIC BLOOD PRESSURE: 68 MMHG | WEIGHT: 186.4 LBS | BODY MASS INDEX: 34.3 KG/M2 | SYSTOLIC BLOOD PRESSURE: 116 MMHG | HEIGHT: 62 IN

## 2019-05-24 DIAGNOSIS — B00.9 HSV (HERPES SIMPLEX VIRUS) INFECTION: ICD-10-CM

## 2019-05-24 DIAGNOSIS — Z67.41 TYPE O BLOOD, RH NEGATIVE: ICD-10-CM

## 2019-05-24 DIAGNOSIS — Z86.19 HISTORY OF SYPHILIS: ICD-10-CM

## 2019-05-24 DIAGNOSIS — Z34.81 NORMAL PREGNANCY IN MULTIGRAVIDA IN FIRST TRIMESTER: Primary | ICD-10-CM

## 2019-05-24 PROCEDURE — 99207 ZZC PRENATAL VISIT: CPT | Performed by: ADVANCED PRACTICE MIDWIFE

## 2019-05-24 ASSESSMENT — MIFFLIN-ST. JEOR: SCORE: 1540.81

## 2019-05-24 NOTE — PROGRESS NOTES
13w6d  Occasional vomiting maybe 2 times per week, very anxious to find out sex of baby.  Upset that has to wait 5 weeks, next visit will be for fetal survey.  Warning signs reviewed.  Should have RPR titer next visit.  rtc in 5 weeks radames

## 2019-06-26 ENCOUNTER — ANCILLARY PROCEDURE (OUTPATIENT)
Dept: ULTRASOUND IMAGING | Facility: CLINIC | Age: 24
End: 2019-06-26
Attending: ADVANCED PRACTICE MIDWIFE
Payer: COMMERCIAL

## 2019-06-26 ENCOUNTER — PRENATAL OFFICE VISIT (OUTPATIENT)
Dept: MIDWIFE SERVICES | Facility: CLINIC | Age: 24
End: 2019-06-26
Attending: ADVANCED PRACTICE MIDWIFE
Payer: COMMERCIAL

## 2019-06-26 VITALS
BODY MASS INDEX: 34.78 KG/M2 | HEIGHT: 62 IN | SYSTOLIC BLOOD PRESSURE: 135 MMHG | HEART RATE: 94 BPM | DIASTOLIC BLOOD PRESSURE: 79 MMHG | WEIGHT: 189 LBS | OXYGEN SATURATION: 96 %

## 2019-06-26 DIAGNOSIS — Z34.81 NORMAL PREGNANCY IN MULTIGRAVIDA IN FIRST TRIMESTER: ICD-10-CM

## 2019-06-26 DIAGNOSIS — Z34.82 ENCOUNTER FOR SUPERVISION OF OTHER NORMAL PREGNANCY, SECOND TRIMESTER: Primary | ICD-10-CM

## 2019-06-26 PROCEDURE — 76805 OB US >/= 14 WKS SNGL FETUS: CPT | Performed by: OBSTETRICS & GYNECOLOGY

## 2019-06-26 PROCEDURE — 99207 ZZC PRENATAL VISIT: CPT | Performed by: ADVANCED PRACTICE MIDWIFE

## 2019-06-26 ASSESSMENT — MIFFLIN-ST. JEOR: SCORE: 1552.61

## 2019-06-27 NOTE — PROGRESS NOTES
Pt here after ultrasound, feeling well, no concerns. Has gender in an envelope because partner unable to be here today so will find out when he gets home from work. Starting to feel small movements. No cramping, LOF or bleeding. History of syphilis, check titers with GCT and hgb at next visit in 6 weeks. MML

## 2019-08-08 ENCOUNTER — PRENATAL OFFICE VISIT (OUTPATIENT)
Dept: MIDWIFE SERVICES | Facility: CLINIC | Age: 24
End: 2019-08-08
Payer: COMMERCIAL

## 2019-08-08 VITALS
OXYGEN SATURATION: 99 % | SYSTOLIC BLOOD PRESSURE: 109 MMHG | BODY MASS INDEX: 35.32 KG/M2 | DIASTOLIC BLOOD PRESSURE: 70 MMHG | WEIGHT: 190 LBS | HEART RATE: 91 BPM

## 2019-08-08 DIAGNOSIS — Z34.82 ENCOUNTER FOR SUPERVISION OF OTHER NORMAL PREGNANCY, SECOND TRIMESTER: ICD-10-CM

## 2019-08-08 DIAGNOSIS — Z86.19 HISTORY OF SYPHILIS: ICD-10-CM

## 2019-08-08 DIAGNOSIS — Z67.41 TYPE O BLOOD, RH NEGATIVE: ICD-10-CM

## 2019-08-08 DIAGNOSIS — B00.9 HSV (HERPES SIMPLEX VIRUS) INFECTION: ICD-10-CM

## 2019-08-08 LAB
BLD GP AB SCN SERPL QL: NORMAL
GLUCOSE 1H P 50 G GLC PO SERPL-MCNC: 75 MG/DL (ref 60–129)
HGB BLD-MCNC: 11.4 G/DL (ref 11.7–15.7)

## 2019-08-08 PROCEDURE — 99000 SPECIMEN HANDLING OFFICE-LAB: CPT | Performed by: ADVANCED PRACTICE MIDWIFE

## 2019-08-08 PROCEDURE — 99207 ZZC PRENATAL VISIT: CPT | Performed by: ADVANCED PRACTICE MIDWIFE

## 2019-08-08 PROCEDURE — 86780 TREPONEMA PALLIDUM: CPT | Performed by: ADVANCED PRACTICE MIDWIFE

## 2019-08-08 PROCEDURE — 36415 COLL VENOUS BLD VENIPUNCTURE: CPT | Performed by: ADVANCED PRACTICE MIDWIFE

## 2019-08-08 PROCEDURE — 86592 SYPHILIS TEST NON-TREP QUAL: CPT | Performed by: ADVANCED PRACTICE MIDWIFE

## 2019-08-08 PROCEDURE — 82950 GLUCOSE TEST: CPT | Performed by: ADVANCED PRACTICE MIDWIFE

## 2019-08-08 PROCEDURE — 86850 RBC ANTIBODY SCREEN: CPT | Performed by: ADVANCED PRACTICE MIDWIFE

## 2019-08-08 PROCEDURE — 00000218 ZZHCL STATISTIC OBHBG - HEMOGLOBIN: Performed by: ADVANCED PRACTICE MIDWIFE

## 2019-08-08 PROCEDURE — 86780 TREPONEMA PALLIDUM: CPT | Mod: 90 | Performed by: ADVANCED PRACTICE MIDWIFE

## 2019-08-08 NOTE — PROGRESS NOTES
24w5d  1 hour GCT today.   Passed at 75.  Good questions about fetal growth.   Having a 'boy'   rtc in 4 weeks PTL reviewed. radames

## 2019-08-09 ENCOUNTER — TELEPHONE (OUTPATIENT)
Dept: MIDWIFE SERVICES | Facility: CLINIC | Age: 24
End: 2019-08-09

## 2019-08-09 LAB
RPR SER QL: NONREACTIVE
T PALLIDUM AB SER QL: REACTIVE

## 2019-08-09 NOTE — TELEPHONE ENCOUNTER
Specialty lab called regarding positive treponema antibodies. Routing to MB as FYI.   Rubina Poole, SAMUEL-BSN

## 2019-08-11 LAB
RPR SER QL: NONREACTIVE
T PALLIDUM AB SER QL AGGL: REACTIVE

## 2019-09-06 ENCOUNTER — PRENATAL OFFICE VISIT (OUTPATIENT)
Dept: MIDWIFE SERVICES | Facility: CLINIC | Age: 24
End: 2019-09-06
Payer: COMMERCIAL

## 2019-09-06 VITALS
HEART RATE: 112 BPM | WEIGHT: 189 LBS | SYSTOLIC BLOOD PRESSURE: 131 MMHG | DIASTOLIC BLOOD PRESSURE: 84 MMHG | BODY MASS INDEX: 35.13 KG/M2

## 2019-09-06 DIAGNOSIS — Z23 NEED FOR TDAP VACCINATION: ICD-10-CM

## 2019-09-06 DIAGNOSIS — Z67.41 TYPE O BLOOD, RH NEGATIVE: ICD-10-CM

## 2019-09-06 DIAGNOSIS — Z34.82 ENCOUNTER FOR SUPERVISION OF OTHER NORMAL PREGNANCY, SECOND TRIMESTER: Primary | ICD-10-CM

## 2019-09-06 PROCEDURE — 90715 TDAP VACCINE 7 YRS/> IM: CPT | Performed by: ADVANCED PRACTICE MIDWIFE

## 2019-09-06 PROCEDURE — 90471 IMMUNIZATION ADMIN: CPT | Performed by: ADVANCED PRACTICE MIDWIFE

## 2019-09-06 PROCEDURE — 99207 ZZC PRENATAL VISIT: CPT | Performed by: ADVANCED PRACTICE MIDWIFE

## 2019-09-06 NOTE — PROGRESS NOTES
28w6d  Roberto is here by herself today. Discussed Rhogam for Rh-, done today. Also had Tdap today. Baby is active, denies bleeding, leaking of fluid, contractions. Has had some headaches, but denies vision changes. Headaches go away with water/rest/tylenol. Gave her handout for meds that are OK in pregnancy, asking if she can take Nyquil. Discussed danger signs and when to call CNM. Return to care at 32 weeks.  Pascual Corona CNM

## 2019-10-04 ENCOUNTER — PRENATAL OFFICE VISIT (OUTPATIENT)
Dept: MIDWIFE SERVICES | Facility: CLINIC | Age: 24
End: 2019-10-04
Payer: COMMERCIAL

## 2019-10-04 VITALS
HEART RATE: 95 BPM | HEIGHT: 62 IN | WEIGHT: 190.6 LBS | DIASTOLIC BLOOD PRESSURE: 79 MMHG | BODY MASS INDEX: 35.07 KG/M2 | SYSTOLIC BLOOD PRESSURE: 134 MMHG | OXYGEN SATURATION: 96 %

## 2019-10-04 DIAGNOSIS — Z34.83 PRENATAL CARE, SUBSEQUENT PREGNANCY IN THIRD TRIMESTER: Primary | ICD-10-CM

## 2019-10-04 PROCEDURE — 99207 ZZC PRENATAL VISIT: CPT | Performed by: ADVANCED PRACTICE MIDWIFE

## 2019-10-04 ASSESSMENT — MIFFLIN-ST. JEOR: SCORE: 1559.87

## 2019-10-04 ASSESSMENT — PATIENT HEALTH QUESTIONNAIRE - PHQ9: SUM OF ALL RESPONSES TO PHQ QUESTIONS 1-9: 1

## 2019-10-04 NOTE — PROGRESS NOTES
Roberto is here for a visit.  Is having some lower back pains that appear to be SI joint related.  Discussed using Cat/Cow pose to release the joint.  Denies ctx.  Active baby.  Offered flu shot but wants to wait as she got Rhogam and Tdap last visit.   ASSESSMENT: 32w6d   PLAN: RTC in 2 wks for PNV and then weekly.    PIERRE

## 2019-10-22 ENCOUNTER — PRENATAL OFFICE VISIT (OUTPATIENT)
Dept: MIDWIFE SERVICES | Facility: CLINIC | Age: 24
End: 2019-10-22
Payer: COMMERCIAL

## 2019-10-22 VITALS — WEIGHT: 192.2 LBS | DIASTOLIC BLOOD PRESSURE: 80 MMHG | SYSTOLIC BLOOD PRESSURE: 114 MMHG | BODY MASS INDEX: 35.73 KG/M2

## 2019-10-22 DIAGNOSIS — Z23 NEED FOR PROPHYLACTIC VACCINATION AND INOCULATION AGAINST INFLUENZA: ICD-10-CM

## 2019-10-22 DIAGNOSIS — Z86.19 HISTORY OF HERPES SIMPLEX INFECTION: ICD-10-CM

## 2019-10-22 DIAGNOSIS — Z34.83 ENCOUNTER FOR SUPERVISION OF OTHER NORMAL PREGNANCY, THIRD TRIMESTER: Primary | ICD-10-CM

## 2019-10-22 LAB — HGB BLD-MCNC: 12.7 G/DL (ref 11.7–15.7)

## 2019-10-22 PROCEDURE — 87653 STREP B DNA AMP PROBE: CPT | Performed by: ADVANCED PRACTICE MIDWIFE

## 2019-10-22 PROCEDURE — 99207 ZZC PRENATAL VISIT: CPT | Performed by: ADVANCED PRACTICE MIDWIFE

## 2019-10-22 PROCEDURE — 90471 IMMUNIZATION ADMIN: CPT | Performed by: ADVANCED PRACTICE MIDWIFE

## 2019-10-22 PROCEDURE — 36416 COLLJ CAPILLARY BLOOD SPEC: CPT | Performed by: ADVANCED PRACTICE MIDWIFE

## 2019-10-22 PROCEDURE — 90686 IIV4 VACC NO PRSV 0.5 ML IM: CPT | Performed by: ADVANCED PRACTICE MIDWIFE

## 2019-10-22 PROCEDURE — 00000218 ZZHCL STATISTIC OBHBG - HEMOGLOBIN: Performed by: ADVANCED PRACTICE MIDWIFE

## 2019-10-22 RX ORDER — ACYCLOVIR 400 MG/1
400 TABLET ORAL EVERY 8 HOURS
Qty: 105 TABLET | Refills: 0 | Status: ON HOLD | OUTPATIENT
Start: 2019-10-22 | End: 2019-10-28

## 2019-10-22 NOTE — NURSING NOTE
Clinic Administered Medication Documentation    MEDICATION LIST:   Injectable Medication Documentation    Patient was given flu shot. Prior to medication administration, verified patients identity using patient s name and date of birth. Please see MAR and medication order for additional information. Patient instructed to remain in clinic for 15 minutes.      Was entire vial of medication used? Yes  Vial/Syringe: Single dose vial  Expiration Date:  6/30/2020  Was this medication supplied by the patient? No

## 2019-10-23 LAB
GP B STREP DNA SPEC QL NAA+PROBE: NEGATIVE
SPECIMEN SOURCE: NORMAL

## 2019-10-26 ENCOUNTER — NURSE TRIAGE (OUTPATIENT)
Dept: NURSING | Facility: CLINIC | Age: 24
End: 2019-10-26

## 2019-10-26 ENCOUNTER — TELEPHONE (OUTPATIENT)
Dept: MIDWIFE SERVICES | Facility: CLINIC | Age: 24
End: 2019-10-26

## 2019-10-26 NOTE — TELEPHONE ENCOUNTER
Caller states she is pregnant and LORENA is 19. Caller states she went to the toilet and when she wiped, there was pinkish discharge on toilet paper. Triage guidelines recommend to call provider immediately. Triager called out to answering service, left message for on call midwife Marjorie to call patient at [phone]. Caller advised to call back if no return call within 20 minutes. Caller verbalized and understands directives.    Reason for Disposition    [1] Pregnant 24-36 weeks () AND [2] pinkish or brownish mucous discharge    Additional Information    Negative: [1] Vaginal bleeding AND [2] pregnant > 20 weeks    Negative: [1] Vaginal bleeding AND [2] pregnant < 20 weeks    Negative: [1] Having contractions or other symptoms of labor AND [2] < 37 weeks pregnant (i.e., )    Negative: [1] Having contractions or other symptoms of labor AND [2] > 36 weeks pregnant (i.e., term pregnancy)    Negative: Leakage of fluid (trickle, gush) from the vagina    Negative: Foreign body in vagina (e.g., tampon)    Negative: Pain or burning with urination is main symptom    Negative: [1] Pregnant 23 or more weeks AND [2] baby is moving less today (e.g., kick count < 5 in 1 hour or < 10 in 2 hours)    Negative: Patient sounds very sick or weak to the triager    Negative: [1] Constant abdominal pain AND [2] present > 2 hours    Negative: [1] Intermittent lower abdominal pain AND [2] present > 24 hours    Protocols used: PREGNANCY - VAGINAL [id number]-V-AW

## 2019-10-27 ENCOUNTER — NURSE TRIAGE (OUTPATIENT)
Dept: NURSING | Facility: CLINIC | Age: 24
End: 2019-10-27

## 2019-10-27 ENCOUNTER — HOSPITAL ENCOUNTER (INPATIENT)
Facility: CLINIC | Age: 24
LOS: 2 days | Discharge: HOME HEALTH | End: 2019-10-29
Attending: ADVANCED PRACTICE MIDWIFE | Admitting: ADVANCED PRACTICE MIDWIFE
Payer: COMMERCIAL

## 2019-10-27 DIAGNOSIS — F81.9 LEARNING DISABILITY: ICD-10-CM

## 2019-10-27 LAB
ABO + RH BLD: ABNORMAL
ABO + RH BLD: ABNORMAL
AMPHETAMINES UR QL SCN: NEGATIVE
BLD GP AB INVEST PLASRBC-IMP: ABNORMAL
BLD GP AB SCN SERPL QL: ABNORMAL
BLOOD BANK CMNT PATIENT-IMP: ABNORMAL
BLOOD BANK CMNT PATIENT-IMP: ABNORMAL
BLOOD BANK CMNT PATIENT-IMP: NORMAL
CANNABINOIDS UR QL: NEGATIVE
COCAINE UR QL: NEGATIVE
OPIATES UR QL SCN: NEGATIVE
PCP UR QL SCN: NEGATIVE
SPECIMEN EXP DATE BLD: ABNORMAL

## 2019-10-27 PROCEDURE — 86900 BLOOD TYPING SEROLOGIC ABO: CPT | Performed by: ADVANCED PRACTICE MIDWIFE

## 2019-10-27 PROCEDURE — 86870 RBC ANTIBODY IDENTIFICATION: CPT | Performed by: ADVANCED PRACTICE MIDWIFE

## 2019-10-27 PROCEDURE — 36415 COLL VENOUS BLD VENIPUNCTURE: CPT | Performed by: ADVANCED PRACTICE MIDWIFE

## 2019-10-27 PROCEDURE — 25000128 H RX IP 250 OP 636

## 2019-10-27 PROCEDURE — 72200001 ZZH LABOR CARE VAGINAL DELIVERY SINGLE

## 2019-10-27 PROCEDURE — 85461 HEMOGLOBIN FETAL: CPT | Performed by: ADVANCED PRACTICE MIDWIFE

## 2019-10-27 PROCEDURE — 86901 BLOOD TYPING SEROLOGIC RH(D): CPT | Performed by: ADVANCED PRACTICE MIDWIFE

## 2019-10-27 PROCEDURE — 12000001 ZZH R&B MED SURG/OB UMMC

## 2019-10-27 PROCEDURE — G0463 HOSPITAL OUTPT CLINIC VISIT: HCPCS

## 2019-10-27 PROCEDURE — 86850 RBC ANTIBODY SCREEN: CPT | Performed by: ADVANCED PRACTICE MIDWIFE

## 2019-10-27 PROCEDURE — 10907ZC DRAINAGE OF AMNIOTIC FLUID, THERAPEUTIC FROM PRODUCTS OF CONCEPTION, VIA NATURAL OR ARTIFICIAL OPENING: ICD-10-PCS | Performed by: ADVANCED PRACTICE MIDWIFE

## 2019-10-27 PROCEDURE — 59400 OBSTETRICAL CARE: CPT | Performed by: ADVANCED PRACTICE MIDWIFE

## 2019-10-27 PROCEDURE — 80307 DRUG TEST PRSMV CHEM ANLYZR: CPT | Performed by: ADVANCED PRACTICE MIDWIFE

## 2019-10-27 PROCEDURE — 25000132 ZZH RX MED GY IP 250 OP 250 PS 637: Performed by: ADVANCED PRACTICE MIDWIFE

## 2019-10-27 PROCEDURE — 40000977 ZZH STATISTIC ATTENDANCE AT DELIVERY

## 2019-10-27 PROCEDURE — 86780 TREPONEMA PALLIDUM: CPT | Performed by: ADVANCED PRACTICE MIDWIFE

## 2019-10-27 RX ORDER — OXYTOCIN/0.9 % SODIUM CHLORIDE 30/500 ML
PLASTIC BAG, INJECTION (ML) INTRAVENOUS
Status: DISCONTINUED
Start: 2019-10-27 | End: 2019-10-27 | Stop reason: WASHOUT

## 2019-10-27 RX ORDER — OXYTOCIN 10 [USP'U]/ML
10 INJECTION, SOLUTION INTRAMUSCULAR; INTRAVENOUS
Status: DISCONTINUED | OUTPATIENT
Start: 2019-10-27 | End: 2019-10-29 | Stop reason: HOSPADM

## 2019-10-27 RX ORDER — IBUPROFEN 800 MG/1
800 TABLET, FILM COATED ORAL EVERY 6 HOURS PRN
Status: DISCONTINUED | OUTPATIENT
Start: 2019-10-27 | End: 2019-10-29 | Stop reason: HOSPADM

## 2019-10-27 RX ORDER — OXYTOCIN/0.9 % SODIUM CHLORIDE 30/500 ML
100-340 PLASTIC BAG, INJECTION (ML) INTRAVENOUS CONTINUOUS PRN
Status: DISCONTINUED | OUTPATIENT
Start: 2019-10-27 | End: 2019-10-27

## 2019-10-27 RX ORDER — CARBOPROST TROMETHAMINE 250 UG/ML
250 INJECTION, SOLUTION INTRAMUSCULAR
Status: DISCONTINUED | OUTPATIENT
Start: 2019-10-27 | End: 2019-10-27

## 2019-10-27 RX ORDER — LIDOCAINE HYDROCHLORIDE 10 MG/ML
INJECTION, SOLUTION EPIDURAL; INFILTRATION; INTRACAUDAL; PERINEURAL
Status: DISCONTINUED
Start: 2019-10-27 | End: 2019-10-27 | Stop reason: WASHOUT

## 2019-10-27 RX ORDER — AMOXICILLIN 250 MG
1 CAPSULE ORAL 2 TIMES DAILY
Status: DISCONTINUED | OUTPATIENT
Start: 2019-10-27 | End: 2019-10-29 | Stop reason: HOSPADM

## 2019-10-27 RX ORDER — BISACODYL 10 MG
10 SUPPOSITORY, RECTAL RECTAL DAILY PRN
Status: DISCONTINUED | OUTPATIENT
Start: 2019-10-29 | End: 2019-10-29 | Stop reason: HOSPADM

## 2019-10-27 RX ORDER — NALOXONE HYDROCHLORIDE 0.4 MG/ML
.1-.4 INJECTION, SOLUTION INTRAMUSCULAR; INTRAVENOUS; SUBCUTANEOUS
Status: DISCONTINUED | OUTPATIENT
Start: 2019-10-27 | End: 2019-10-27

## 2019-10-27 RX ORDER — AMOXICILLIN 250 MG
2 CAPSULE ORAL 2 TIMES DAILY
Status: DISCONTINUED | OUTPATIENT
Start: 2019-10-27 | End: 2019-10-29 | Stop reason: HOSPADM

## 2019-10-27 RX ORDER — ONDANSETRON 2 MG/ML
4 INJECTION INTRAMUSCULAR; INTRAVENOUS EVERY 6 HOURS PRN
Status: DISCONTINUED | OUTPATIENT
Start: 2019-10-27 | End: 2019-10-27

## 2019-10-27 RX ORDER — NALOXONE HYDROCHLORIDE 0.4 MG/ML
.1-.4 INJECTION, SOLUTION INTRAMUSCULAR; INTRAVENOUS; SUBCUTANEOUS
Status: DISCONTINUED | OUTPATIENT
Start: 2019-10-27 | End: 2019-10-29 | Stop reason: HOSPADM

## 2019-10-27 RX ORDER — IBUPROFEN 800 MG/1
800 TABLET, FILM COATED ORAL
Status: COMPLETED | OUTPATIENT
Start: 2019-10-27 | End: 2019-10-27

## 2019-10-27 RX ORDER — OXYTOCIN 10 [USP'U]/ML
10 INJECTION, SOLUTION INTRAMUSCULAR; INTRAVENOUS
Status: COMPLETED | OUTPATIENT
Start: 2019-10-27 | End: 2019-10-27

## 2019-10-27 RX ORDER — OXYTOCIN/0.9 % SODIUM CHLORIDE 30/500 ML
100 PLASTIC BAG, INJECTION (ML) INTRAVENOUS CONTINUOUS
Status: DISCONTINUED | OUTPATIENT
Start: 2019-10-27 | End: 2019-10-29 | Stop reason: HOSPADM

## 2019-10-27 RX ORDER — OXYCODONE AND ACETAMINOPHEN 5; 325 MG/1; MG/1
1 TABLET ORAL
Status: DISCONTINUED | OUTPATIENT
Start: 2019-10-27 | End: 2019-10-27

## 2019-10-27 RX ORDER — MISOPROSTOL 200 UG/1
TABLET ORAL
Status: DISCONTINUED
Start: 2019-10-27 | End: 2019-10-27 | Stop reason: WASHOUT

## 2019-10-27 RX ORDER — SODIUM CHLORIDE, SODIUM LACTATE, POTASSIUM CHLORIDE, CALCIUM CHLORIDE 600; 310; 30; 20 MG/100ML; MG/100ML; MG/100ML; MG/100ML
INJECTION, SOLUTION INTRAVENOUS CONTINUOUS
Status: DISCONTINUED | OUTPATIENT
Start: 2019-10-27 | End: 2019-10-27

## 2019-10-27 RX ORDER — OXYTOCIN 10 [USP'U]/ML
INJECTION, SOLUTION INTRAMUSCULAR; INTRAVENOUS
Status: COMPLETED
Start: 2019-10-27 | End: 2019-10-27

## 2019-10-27 RX ORDER — METHYLERGONOVINE MALEATE 0.2 MG/ML
200 INJECTION INTRAVENOUS
Status: DISCONTINUED | OUTPATIENT
Start: 2019-10-27 | End: 2019-10-27

## 2019-10-27 RX ORDER — ACETAMINOPHEN 325 MG/1
650 TABLET ORAL EVERY 4 HOURS PRN
Status: DISCONTINUED | OUTPATIENT
Start: 2019-10-27 | End: 2019-10-29 | Stop reason: HOSPADM

## 2019-10-27 RX ORDER — FENTANYL CITRATE 50 UG/ML
50-100 INJECTION, SOLUTION INTRAMUSCULAR; INTRAVENOUS
Status: DISCONTINUED | OUTPATIENT
Start: 2019-10-27 | End: 2019-10-27

## 2019-10-27 RX ORDER — LANOLIN 100 %
OINTMENT (GRAM) TOPICAL
Status: DISCONTINUED | OUTPATIENT
Start: 2019-10-27 | End: 2019-10-29 | Stop reason: HOSPADM

## 2019-10-27 RX ORDER — HYDROCORTISONE 2.5 %
CREAM (GRAM) TOPICAL 3 TIMES DAILY PRN
Status: DISCONTINUED | OUTPATIENT
Start: 2019-10-27 | End: 2019-10-29 | Stop reason: HOSPADM

## 2019-10-27 RX ORDER — ACETAMINOPHEN 325 MG/1
650 TABLET ORAL EVERY 4 HOURS PRN
Status: DISCONTINUED | OUTPATIENT
Start: 2019-10-27 | End: 2019-10-27

## 2019-10-27 RX ORDER — OXYTOCIN/0.9 % SODIUM CHLORIDE 30/500 ML
340 PLASTIC BAG, INJECTION (ML) INTRAVENOUS CONTINUOUS PRN
Status: DISCONTINUED | OUTPATIENT
Start: 2019-10-27 | End: 2019-10-29 | Stop reason: HOSPADM

## 2019-10-27 RX ADMIN — SENNOSIDES AND DOCUSATE SODIUM 1 TABLET: 8.6; 5 TABLET ORAL at 21:05

## 2019-10-27 RX ADMIN — OXYTOCIN 10 UNITS: 10 INJECTION, SOLUTION INTRAMUSCULAR; INTRAVENOUS at 10:26

## 2019-10-27 RX ADMIN — ACETAMINOPHEN 650 MG: 325 TABLET, FILM COATED ORAL at 21:05

## 2019-10-27 RX ADMIN — IBUPROFEN 800 MG: 800 TABLET, FILM COATED ORAL at 11:04

## 2019-10-27 NOTE — PLAN OF CARE
Late entry due to patient care.    Pt arrived at 0845 for labor assessment. Pt very uncomfortable with contractions. Bright red vaginal bleeding noted, pt denies LOF. EFM and toco applied. Category 2 tracing noted, moderate variability, + accels, intermittent VD. BRE Amador notified of pt arrival and bleeding. Request for assessment in person. On arrival of CNTRUE, SVE 10/100/-2 with bulging bag of rodrigues. Admission assessment in process.

## 2019-10-27 NOTE — PROGRESS NOTES
Patient arrived to [facility] unit via wheelchair at 1400,with belongings, accompanied by spouse/ significant other, with infant in arms. Received report from Traci MOORE and checked bands. Unit and room orientation started. Call light given; no concerns present at this time. Continue with plan of care.

## 2019-10-27 NOTE — PLAN OF CARE
of viable male at 1020. NICU in attendance due to 36.1 gestation. Drug screen sent due to  delivery. Awaiting results. Apgars 8/9. Intact perineum, EBL 100mL. Fundus and lochia WDL. Continue to monitor per protocol.

## 2019-10-27 NOTE — H&P
Roberto Caro is a 24 year old female,    Patient's last menstrual period was 2019., Estimated Date of Delivery: 2019 is calculated from early U/S alone (<14wks)     Pt [facility] on 10/27/2019 at 10:41 AM     in transition labor.  Membranes are bulging    HPI: Roberto started having early labor symptoms yesterday. This morning her contractions become very frequent and very intense. She reports bloody show. Her baby has been active.   She is GBS -, HSV + and started her acyclovir yesterday.     PRENATAL COURSE  Prenatal care began at 7 wks gestation for a total of 8 prenatal visits.  Total wt gain 30  Prenatal vital signs WNL  Prenatal course was essentially uncomplicated    HISTORIES  No Known Allergies  Past Medical History:   Diagnosis Date     Postpartum depression 2012     Rh incompatibility      SIDS (sudden infant death syndrome)      Past Surgical History:   Procedure Laterality Date     LAPAROSCOPIC TUBAL DYE STUDY Bilateral 2014    Procedure: LAPAROSCOPIC TUBAL DYE STUDY;  Surgeon: Linda Vasquez MD;  Location: [facility]     LAPAROSCOPY OPERATIVE ADULT N/A 2014    Procedure: LAPAROSCOPY OPERATIVE ADULT;  Surgeon: Linda Vasquez MD;  Location: [facility]     REMOVE INTRAUTERINE DEVICE N/A 2014    Procedure: REMOVE INTRAUTERINE DEVICE;  Surgeon: Linda Vasquez MD;  Location: [facility]     Family History   Problem Relation Age of Onset     Hypertension Mother      Kidney Disease Father         needs kidney     Asthma Brother      Diabetes Maternal Grandfather      Alzheimer Disease Maternal Grandfather      Cardiovascular Maternal Grandfather      Hypertension Maternal Grandfather      Diabetes Maternal Grandmother      Arthritis Maternal Grandmother      Hypertension Maternal Grandmother      Arthritis Maternal Aunt      Pneumonia Son         1 month old     Social History     Tobacco Use     Smoking status: Never Smoker      Smokeless tobacco: Never Used   Substance Use Topics     Alcohol use: No     OB History    Para Term  AB Living   6 2 2 0 3 1   SAB TAB Ectopic Multiple Live Births   1 2 0 0 1      # Outcome Date GA Lbr Alfredo/2nd Weight Sex Delivery Anes PTL Lv   6 Current            5 TAB 2018 16w0d    AB, COMPLETE      4 SAB 2018 8w0d          3 TAB 2017 8w0d          2 Term 16 38w4d 04:35 / 00:07 3.118 kg (6 lb 14 oz) M Vag-Spont None N MAIRA      Name: Polo Henriquez      Apgar1: 8  Apgar5: 9   1 Term 11 39w2d 05:50 / 00:24 3.26 kg (7 lb 3 oz) M Vag-Spont Local N FD      Birth Comments:  pt reviewed autopsy and pneumonia      Name: Keith      Apgar1: 9  Apgar5: 9       LABS:       Lab Results   Component Value Date    ABO O 2019       Lab Results   Component Value Date    RH Neg 2019     Rhogam indicated and given on 2019 at 28w ga  Rubella immune   Treponema Pallidum Antibody  Negative    HIV    Non-reactive   Lab Results   Component Value Date    HGB 12.7 10/22/2019      Lab Results   Component Value Date    HEPBANG Nonreactive 2019     Lab Results   Component Value Date    GBS Negative 10/22/2019     other     ROS  Pt denies significant constitutional symptoms including fever and/or malaise.  Pt denies significant respiratory, cardiovacular, GI, or muscular/skeletal complaints.      PHYSICAL EXAM:  /62   Temp 98.2  F (36.8  C) (Oral)   Resp 18   LMP 2019   General appearance healthy, alert, active and moderate distress   Neuro:  denies headache and visual disturbances  Psych: Mentation normal and bright   Legs: 2+/2+, no clonus, no edema       Abdomen: gravid, single vertex fetus, non-tender, EFW 6 lbs. Pt is aryan every 1-2 minutes, lasting 60-80 seconds and palpates strong    FETAL HEART TONES: baseline 140 with moderate FHRV variability and accelerations. Variable decelerations present.     PELVIC EXAM: 10/ 100% / -2, AROM performed after exam  and after, an anterior lip of cervix was found.  BLOODY SHOW:: yes   Membranes as listed above  Bright light exam performed and negative for any signs of active HSV infection.     ASSESSMENT:  IUP @ 36 wks gestation  in transition labor  NST  equivocal   Parity: Multip  GBS negative and membranes intact      PLAN:  Admit - see IP orders  Anticipate   Pt is on medicare -  No    Marychuy Amador, JOEM, SUZI DIAZM

## 2019-10-27 NOTE — L&D DELIVERY NOTE
OB Vaginal Delivery Note    Roberto Caro MRN# [id number]   Age: 24 year old YOB: 1995     Brief Labor Note: Connie, came to the birth place in active labor. SHe was complete and AROM was performed. After arom an anterior lip was felt. She labored in a number of positions and followed her body's urge to push. She pushed with great effort and delivered a vigorous baby boy. NICU was in attendence r/ t late  delivery. Delayed cord clamping was done for 1 minute and then the baby was handed to the NICU staff. They left shortly thereafter but were called back r/t the baby developing tachypnea. See their note. Placenta delivered with out complication with minimal bleeding. Perineum inspected and intact. IM pitocin given for prophylaxis    GA: 36w1d  GP:   Labor Complications:     EBL:    mL  Delivery QBL:    Delivery Type: Vaginal, Spontaneous   ROM to Delivery Time: (Delivered) Minutes: 50   Weight:      1 Minute 5 Minute 10 Minute   Apgar Totals: 8    9          CORBY STRICKLAND;KYE EARLY;ABBI BARRETO     Delivery Details:  Roberto Caro, a 24 year old  female delivered a viable infant with apgars of 8   and 9  . Patient was fully dilated and pushing after 6  hours 17  minutes in active labor. Delivery was via vaginal, spontaneous  to a sterile field under none  anesthesia. Infant delivered in vertex  right  occiput  anterior  position. Anterior and posterior shoulders delivered without difficulty. The cord was clamped, cut twice and 3 vessels  were noted. Cord blood was obtained in routine fashion with the following disposition: lab .      Cord complications: nuchal   Placenta delivered at 10/27/2019 10:22 AM . Placental disposition was Hospital disposal . Fundal massage performed and fundus found to be firm.     Episiotomy: none    Perineum, vagina, cervix were inspected, and the following lacerations were noted:   Perineal lacerations: none                      .    Excellent hemostasis was noted. Needle count correct. Infant and patient in delivery room in good and stable condition.        Labor Event Times    Labor onset date:  10/27/19 Onset time:   3:00 AM   Dilation complete date:  10/27/19 Complete time:   9:17 AM   Start pushing date/time:  10/27/2019 0932      Labor Length    1st Stage (hrs):  6 (min):  17   2nd Stage (hrs):  1 (min):  3   3rd Stage (hrs):  0 (min):  2      Labor Events     labor?:  Yes   steroids:  None  Labor Type:  Spontaneous     Antibiotics received during labor?:  No     Rupture identifier:  Sac 1  Rupture date/time: 10/27/19 0930   Rupture type:  Artificial Rupture of Membranes  Fluid color:  Clear  Fluid odor:  Normal     Delivery/Placenta Date and Time    Delivery Date:  10/27/19 Delivery Time:  10:20 AM   Placenta Date/Time:  10/27/2019 10:22 AM  Oxytocin given at the time of delivery:  after delivery of baby     Vaginal Counts     Initial count performed by 2 team members:   Two Team Members   Marychuy Amador Stephanie       Needles Suture [place] Sponges Instruments   Initial counts 2 0 5    Added to count       Final counts 2 0 5    Placed during labor Accounted for at the end of labor   No NA   No NA   No NA    Final count performed by 2 team members:   Two Team Members   Marychuy Ayoub Wieland, Stephanie      Final count correct?:  Yes         Apgars    Living status:  Living   1 Minute 5 Minute 10 Minute 15 Minute 20 Minute   Skin color: 0  1       Heart rate: 2  2       Reflex irritability: 2  2       Muscle tone: 2  2       Respiratory effort: 2  2       Total: 8  9       Apgars assigned by:  JOSE R SILVA     Cord    Vessels:  3 Vessels Complications:  Nuchal   Cord Blood Disposition:  Lab Gases Sent?:  No       Resuscitation    Methods:  None       [place] Care at Delivery:  NNP Delivery Note    Asked by Dr. Amador to attend the delivery of this late , male infant  with a gestational age of 36 1/7 weeks secondary to prematurity.      Infant delivered at 1020 hours on 10/27/2019. Infant had delayed cord clamping x1 minute with spontaneous respirations at birth. He was placed on a warmer, dried, stimulated, and suctioned at birth. Apgars were 8 at one minute and 9 at five minutes of age. Gross PE is WNL except for head molding. Infant was shown to mother and father and will be transferred to the [facility] for further care.    MIGUEL Alas 10/27/2019 10:28 AM     Labor Events and Shoulder Dystocia    Fetal Tracing Prior to Delivery:  Category 2  Fetal Tracing Comments:  variable decels  Shoulder dystocia present?:  Neg             Delivery (Maternal) (Provider to Complete) ([id number])    Episiotomy:  None  Perineal lacerations:  None       Blood Loss  Mother: Roberto Caro #[id number]   Start of Mother's Information    IO Blood Loss  10/27/19 0300 - 10/27/19 1054    None           End of Mother's Information  Mother: Roberto Caro #[id number]         Delivery - Provider to Complete ([id number])    Delivering clinician:  Marychuy Amador APRN CNM CNM Care:  Exclusive CNM care in labor  Delivery Type (Choose the 1 that will go to the Birth History):  Vaginal, Spontaneous   Other personnel:   Provider Role   Traci Hester RN Registered Nurse   Marychuy Amador APRN CNM Certified Nurse Midwife   Tiffany Tan RN Registered Nurse         Placenta    Delayed Cord Clamping:  Done  Date/Time:  10/27/2019 10:22 AM  Removal:  Spontaneous  Disposition:  Hospital disposal     Anesthesia    Method:  None          Presentation and Position    Presentation:  Vertex  Position:  Right Occiput Anterior           Marychuy Amador CNM, SUZI DÍAZ

## 2019-10-27 NOTE — TELEPHONE ENCOUNTER
"FNA triage call :   Presenting problem :  36 weeks pregnant Pt called ,  LORENA =19 and Follows [facility] on 7 th floor ,  Will delivering at [facility] .  Contractions started yesterday  And 10/10 on pain scale during contractions  About 1 minute apart  .   Guideline used : pregnancy labor  A AH .  Disposition and recommendations :  FNA gave report  To [facility]  Labor & Delivery  nurse [place] at  Ph= [phone] and she asked FNA to page midwife and FNA smart web paged Marychuy Amador CNM to advised FNA  sent in Pt to [facility] .    Caller verbalizes understanding and denies further questions and will call back if further symptoms to triage or questions  . Vandana Navarro RN  - [place] Nurse Advisor     Reason for Disposition    Contractions < 10 minutes apart for 1 hour (i.e., 6 or more contractions an hour)    MODERATE-SEVERE abdominal pain    Additional Information    Negative: Passed out (i.e., lost consciousness, collapsed and was not responding)    Negative: Shock suspected (e.g., cold/pale/clammy skin, too weak to stand, low BP, rapid pulse)    Negative: Difficult to awaken or acting confused (e.g., disoriented, slurred speech)    Negative: [1] SEVERE abdominal pain (e.g., excruciating) AND [2] constant AND [3] present > 1 hour    Negative: Severe bleeding (e.g., continuous red blood from vagina, or large blood clots)    Negative: Umbilical cord hanging out of the vagina (shiny, white, curled appearance, \"like telephone cord\")    Negative: Uncontrollable urge to push (i.e., feels like baby is coming out now)    Negative: Can see baby    Negative: Sounds like a life-threatening emergency to the triager    Protocols used: PREGNANCY - LABOR - [id number]      "

## 2019-10-27 NOTE — PLAN OF CARE
Data: Roberto Caro transferred to [id number] via wheelchair at 1330. Baby transferred via parent's arms.  Action: Receiving unit notified of transfer: Yes. Patient and family notified of room change. Report given to Traci Garza RN at 1345. Belongings sent to receiving unit. Accompanied by Registered Nurse. Oriented patient to surroundings. Call light within reach. ID bands double-checked with receiving RN.  Response: Patient tolerated transfer and is stable.

## 2019-10-27 NOTE — PLAN OF CARE
Roberto Caro is PPD 0  Fundus firm, lochia scant-small without clots.  Perineum: intact.  Ambulating, voiding, tolerating diet, breastfeeding well, frequent and independently.   Vitals stable.   Pain controlled with Ibuprofen but has only needed one post birth. States there's no pain right now.   Bonding well with baby. SO involved in cares.

## 2019-10-27 NOTE — TELEPHONE ENCOUNTER
"Pressure \"down below\" started around 8pm.  Patient is 36 weeks pregnant; LORENA: 19.  Patient says contractions are lasting 20-30 seconds apart.  Patient reports she is having pinkish blood when she wipes.  FNA advised caller to phone back FNA in 20-30 minutes if no response from on call provider and caller agreed.  Paged on-call provider, Yanet Márquez, at 9:22 pm via [employer] to call patient back re: messageID: [id number] Antonique MRN: [id number] : [birth year]. LORENA: 19. Pt reports \"pressure\" and feels like contractions; also having pinkish discharge. Started since 8pm. Phone: [phone]. Wesley BAKER/KVNG..      Reason for Disposition    [1] Contractions AND [2] any vaginal bleeding (including: red blood, clots, spotting, or pink/brown mucous)    Additional Information    Negative: Passed out (i.e., lost consciousness, collapsed and was not responding)    Negative: Shock suspected (e.g., cold/pale/clammy skin, too weak to stand, low BP, rapid pulse)    Negative: Difficult to awaken or acting confused (e.g., disoriented, slurred speech)    Negative: [1] SEVERE abdominal pain (e.g., excruciating) AND [2] constant AND [3] present > 1 hour    Negative: Severe bleeding (e.g., continuous red blood from vagina, or large blood clots)    Negative: Umbilical cord hanging out of the vagina (shiny, white, curled appearance, \"like telephone cord\")    Negative: Uncontrollable urge to push (i.e., feels like baby is coming out now)    Negative: Can see baby    Negative: Sounds like a life-threatening emergency to the triager    Negative: [1] Contractions > 10 minutes apart AND [2] persist > 24 hours AND [3] no improvement using CARE ADVICE    Negative: Contractions < 10 minutes apart for 1 hour (i.e., 6 or more contractions an hour)    Negative: MODERATE-SEVERE abdominal pain    Protocols used: PREGNANCY - LABOR - [id number]      "

## 2019-10-27 NOTE — TELEPHONE ENCOUNTER
Pt called with contractions coming about every 5 minutes but lasting about 20 sec. Has no water leaking or vaginal bleeding. Reports good fetal movement. Had small amount of spotting, two episodes today. Discussed labor and coming in. She plans to see if gets a little stronger and then will come in. Will hydrate and rest to see if slows down some. Pager number given to call with questions or when she is ready to come to the hospital. SUZI BostonM

## 2019-10-27 NOTE — PLAN OF CARE
Data: Vital signs within normal limits. Postpartum checks within normal limits - see flow record. Patient eating and drinking normally. Patient able to empty bladder independently and is up ambulating. No apparent signs of infection. Perineum  healing well. Patient performing self cares and is able to care for infant.  Action: Patient medicated during the shift for pain. See MAR. Patient reassessed within 1 hour after each medication and pain was improved - patient stated she was comfortable. Patient education done about unit and room orientation, breastfeeding education. See flow record.  Response: Positive attachment behaviors observed with infant. Support persons partner present.   Plan: Anticipate discharge postpartum day 2.

## 2019-10-28 LAB — HGB BLD-MCNC: 11.1 G/DL (ref 11.7–15.7)

## 2019-10-28 PROCEDURE — 85018 HEMOGLOBIN: CPT | Performed by: ADVANCED PRACTICE MIDWIFE

## 2019-10-28 PROCEDURE — 25000132 ZZH RX MED GY IP 250 OP 250 PS 637: Performed by: ADVANCED PRACTICE MIDWIFE

## 2019-10-28 PROCEDURE — 36415 COLL VENOUS BLD VENIPUNCTURE: CPT | Performed by: ADVANCED PRACTICE MIDWIFE

## 2019-10-28 PROCEDURE — 12000001 ZZH R&B MED SURG/OB UMMC

## 2019-10-28 RX ORDER — AMOXICILLIN 250 MG
1 CAPSULE ORAL DAILY
Qty: 30 TABLET | Refills: 0 | Status: SHIPPED | OUTPATIENT
Start: 2019-10-28 | End: 2019-11-27

## 2019-10-28 RX ORDER — ACETAMINOPHEN 325 MG/1
650 TABLET ORAL EVERY 6 HOURS PRN
Qty: 1 BOTTLE | Refills: 0 | Status: SHIPPED | OUTPATIENT
Start: 2019-10-28 | End: 2019-11-27

## 2019-10-28 RX ORDER — IBUPROFEN 600 MG/1
600 TABLET, FILM COATED ORAL EVERY 6 HOURS PRN
Qty: 60 TABLET | Refills: 0 | Status: SHIPPED | OUTPATIENT
Start: 2019-10-28 | End: 2019-11-27

## 2019-10-28 RX ADMIN — ACETAMINOPHEN 650 MG: 325 TABLET, FILM COATED ORAL at 02:38

## 2019-10-28 RX ADMIN — IBUPROFEN 800 MG: 800 TABLET ORAL at 10:11

## 2019-10-28 RX ADMIN — IBUPROFEN 800 MG: 800 TABLET ORAL at 21:28

## 2019-10-28 RX ADMIN — SENNOSIDES AND DOCUSATE SODIUM 1 TABLET: 8.6; 5 TABLET ORAL at 08:00

## 2019-10-28 NOTE — PROGRESS NOTES
Post Partum Note    Roberto is doing well, up in the room and caring for her baby independently. She is taking tylenol, but declining ibuprofen. She is breastfeeding and supplementing with formula; baby has been a bit cold and BG's are a bit labile-peds following. She expressed concern that they do not have a car seat yet.     SIGNIFICANT PROBLEMS:  Patient Active Problem List    Diagnosis Date Noted     Sudden infant death syndrome 2011     Priority: High       SIDS at 1 month of age.    Death.  Born 11  11       History of syphilis 2011     Priority: High     2010- Diagnosed and treated with 1 dose, titer then 1:16  2011-[place]- Titers every month ( Lab code: RPRTTR) titers have been 1:8. And [facility] did not require retreatment so suggested titers monthly.   11- transfer of care- RPR 4 (1:4 per lab). Repeat monthly    Has peds ID appt 11- see consult note- is in [facility]. Repeat RPR 2 ( neg per ID).   11: 1:2 titre.  Start PCN series.  11 PCN G 2.4 IM #1 given. Repeat for next 2 weeks.    11 PCN G 2.4 IM #2  10/7/11 PCN G 2.4 IM #3  10/12/11:  Titer: 2  11- POSTPARTUM: labs redrawn per peds: RPRTTR, HIV, FTA, EIA(syphilis ABS) titer: 2      6/9/15 anti-treponema: positive  RPR: negative  Treponema antibody: (TPPA): positive  Patient was adequately treated in  (documented above)   Reviewed these lab results with Dr. Tellez and Joellen, with hx of syphilis.  No further f/u needed at this time.      17 Anti-trep: positive  RPR: weakly reactive  Titer: 1   Will send chart to ID for recommendation but will plan for monthly titers for now.          Labor and delivery, indication for care 10/27/2019     Priority: Medium     Type O blood, Rh negative 2019     Priority: Medium     HSV (herpes simplex virus) infection 2019     Priority: Medium     Prophylaxis in last month.        Normal pregnancy in multigravida  04/10/2019     Priority: Medium     4/10/19 [facility] U/S;  7 3/7 weeks, LORENA 19 [facility] U/S; 18 2/7 weeks LORENA 19, posterior placenta, nl fetal survey        Asthma 2013     Priority: Medium     Postpartum depression 2012     Priority: Medium     Learning disability 2011     Priority: Medium     NOTED in transfer records; that patient does not have learning disability, but needs special help in math and reading.  Patient talks slowly, asks for extra time processing or thinking of questions,  Several instructions needed to be repeated several times.  Did not understand some simple questions asked by nurse/MA or CNM.       Learning difficulty 2011     Priority: Medium     Overview:   Overview:   NOTED in transfer records; that patient does not have learning disability, but needs special help in math and reading.  Patient talks slowly, asks for extra time processing or thinking of questions,  Several instructions needed to be repeated several times.  Did not understand some simple questions asked by nurse/MA or CNM.         INTERVAL HISTORY:  /71   Pulse 73   Temp 98  F (36.7  C) (Oral)   Resp 18   LMP 2019   Breastfeeding? Unknown   Pt stable, baby is rooming in  Breast feeding status:initiated and breastfeeding with formula supplementation per medical reason-low blood glucose, poor suck and swallow, late-  Complications since 2 hours post delivery: None  Patient is tolerating acitivity well Voiding without difficulty, cramping is minimal, lochia is decreasing and patient denies clots.  Perineal pain is is minimal.  The perineum is intact    Postpartum hemoglobin   Hemoglobin   Date Value Ref Range Status   10/28/2019 11.1 (L) 11.7 - 15.7 g/dL Final     Blood type   Lab Results   Component Value Date    ABO O 10/27/2019    ABO O 10/27/2019       Lab Results   Component Value Date    RH Neg 10/27/2019    RH Neg 10/27/2019     Rubella status   Lab Results    Component Value Date    RUBBRADFORDGG immune 2011     History of depression: Roberto denies. However, postpartum depression is on her problem list from .    ASSESSMENT/PLAN:  Normal postpartum exam   Stable Post-partum day #1  Encouraged ibuprofen to help with cramping and perineal discomfort.  Complications:breastfeeding difficulties  Plan d/c home tomorrow  RTC 6 weeks unless she desires IUD, then 8 weeks is more appropriate.  Teaching done: D/C Instructions: Nutrition/Activity, Birth Control Options, Warning Signs/When to Call: Excessive Bleeding, Infection, PP Depression, RTC Clinic for PP Appointment and PNV-needs reinforcement with discharge teaching.  Birthcontrol planned:Undecided. Fertility and contraception options reviewed.  Current Discharge Medication List      START taking these medications    Details   acetaminophen (TYLENOL) 325 MG tablet Take 2 tablets (650 mg) by mouth every 6 hours as needed for mild pain Start after Delivery.  Qty: 1 Bottle, Refills: 0    Associated Diagnoses:  (normal spontaneous vaginal delivery)      ibuprofen (ADVIL/MOTRIN) 600 MG tablet Take 1 tablet (600 mg) by mouth every 6 hours as needed for moderate pain Start after delivery  Qty: 60 tablet, Refills: 0    Associated Diagnoses:  (normal spontaneous vaginal delivery)      senna-docusate (SENOKOT-S/PERICOLACE) 8.6-50 MG tablet Take 1 tablet by mouth daily Start after delivery.  Qty: 30 tablet, Refills: 0    Associated Diagnoses:  (normal spontaneous vaginal delivery)         CONTINUE these medications which have NOT CHANGED    Details   Prenatal Vit-Fe Fumarate-FA (PRENATAL VITAMIN) 27-0.8 MG TABS Take 1 tablet by mouth         STOP taking these medications       acyclovir (ZOVIRAX) 400 MG tablet Comments:   Reason for Stopping:             Pascual Corona CNM

## 2019-10-28 NOTE — PLAN OF CARE
VSS. Postpartum check WDL. Pain managed with oral meds. Up ad garry. Tolerating regular diet. Voiding without difficulty. Bonding well with baby. EDS 2. Continue cares.

## 2019-10-28 NOTE — LACTATION NOTE
This note was copied from a baby's chart.  Consult for: Second time breastfeeding, first time with late  infant.    History: Vaginal delivery @ 36w1d, AGA infant @ 5# 6 oz. birthweight, 2.2% loss at 24 hours, no bili results yet.  BG low requiring gel and extra supplement overnight, temp low x2 when he was not taking supplements well. Feeding much improved today with bottle and slow flow nipple, taking up to 15 mL now with gentle chin support. Maternal history of asthma, STI's, learning difficulty asks for extra time processing and thinking of questions, first baby  of SIDS at 1 month old. Roberto reports she  her first baby for 3 months & had good milk supply, says she desires to breastfeed this time also.    Education provided: Discussed potential feeding challenges of and ways to support LPT infants including benefits of extra supplements and rationale for nipple shield use initially and importance of LC follow up outpatient. Reviewed supply and demand, importance of early days for initiating good milk supply and possible effects if not pumping.  Discussed how to place nipple shield and latch him fully onto it with cheeks up against breast, but he was bottle feeding on arrival so no latch practice done. Taught how to allow bottle further in mouth, and use gentle chin support with mom able to see resulting improvement in milk transfer. Encouraged parents to call RN with early feeding cues to offer breastfeeding prior to next bottle. Reviewed benefits of frequent skin to skin and feeding on cue but no longer than 3 hours between, benefits of breast massage, hand expression & hands on pumping, how to tell when satiated and if getting enough, what to expect in the coming days and briefly reviewed tips for preventing engorgement. Oriented to breastfeeding log with when and who to call if concerns and breastfeeding resource list adding in Branch & additional community resources. Encouraged  parents to check with insurance on rental pump coverage. Show how to use rental pump in both initiate and maintain modes, taught when to switch over. Dad and mom active in learning, repeat back optimal feeding routines, how to tell if getting enough and when to call if concerns.     Plan:   Frequent skin to skin, breastfeed on cue using nipple shield, goal of 8 to 12 feedings in 24 hours, watch for early cues. No longer than 3 hours between feedings, limit to about 15-20 minutes each time using breast compressions to enhance milk transfer in that time & supplement after according to guidelines. Hand express after each feeding until milk is in and hands on pumping at least 6-8 times in 24 hours to help bring in full milk supply. Follow up with outpatient lactation consultant at [facility] within a week of discharge for support with LPT infant, help to monitor infant weight and milk transfer, establish supply & eventually wean from pumping and nipple shield. Due to infant prematurity, recommend renting hospital grade breast pump at discharge to help bring in full milk supply.     Supplement Guidelines for infants <37 weeks gestation or < 6 lbs at birth per the [facility] for the  Infant (35-42 weeks) Policy ([facility] Guidelines):  Infants <37 weeks OR <6 pounds   Birth-24 hours of age: 5ml (1 tsp) every 2 - 3 hours, at least 8 times in 24 hours   24-48 hours of age: 10 ml (2 tsp) every 2 - 3 hours, at least 8 times in 24 hours   48-72 hours of age: 15 ml (3 tsp) every 2 - 3 hours, at least 8 times in 24 hours

## 2019-10-28 NOTE — PLAN OF CARE
VSS. Postpartum assessment WNL. Reports some lower back pain and uterine cramping. She took Tylenol overnight with good relief. Declined Ibuprofen. Patient wants to breastfeed and formula feed (LPT requires supplementation). However, patient only pumped 1x overnight despite RN prompting with every feed. RN explained importance of protecting milk supply. She only put baby to breast 2x last night with nipple shield. Baby did not latch and suck with either attempt. Released lactation consult. She appears to be bonding well with baby. No acute concerns at this time.

## 2019-10-29 VITALS
OXYGEN SATURATION: 99 % | SYSTOLIC BLOOD PRESSURE: 130 MMHG | RESPIRATION RATE: 16 BRPM | TEMPERATURE: 98 F | HEART RATE: 73 BPM | DIASTOLIC BLOOD PRESSURE: 83 MMHG

## 2019-10-29 PROCEDURE — 25000132 ZZH RX MED GY IP 250 OP 250 PS 637: Performed by: ADVANCED PRACTICE MIDWIFE

## 2019-10-29 RX ADMIN — IBUPROFEN 800 MG: 800 TABLET ORAL at 10:28

## 2019-10-29 NOTE — PROGRESS NOTES
Post Partum Note    Roberto Caro      MRN#: [id number]  Age: 24 year old      YOB: 1995      Post-partum day #2    SIGNIFICANT PROBLEMS:  Patient Active Problem List    Diagnosis Date Noted     Sudden infant death syndrome 2011     Priority: High       SIDS at 1 month of age.    Death.  Born 11  11       History of syphilis 2011     Priority: High     2010- Diagnosed and treated with 1 dose, titer then 1:16  2011-[place]- Titers every month ( Lab code: RPRTTR) titers have been 1:8. And [facility] department did not require retreatment so suggested titers monthly.   11- transfer of care- RPR 4 (1:4 per lab). Repeat monthly    Has peds ID appt 11- see consult note- is in [facility]. Repeat RPR 2 ( neg per ID).   11: 1:2 titre.  Start PCN series.  11 PCN G 2.4 IM #1 given. Repeat for next 2 weeks.    11 PCN G 2.4 IM #2  10/7/11 PCN G 2.4 IM #3  10/12/11:  Titer: 2  11- POSTPARTUM: labs redrawn per peds: RPRTTR, HIV, FTA, EIA(syphilis ABS) titer: 2      6/9/15 anti-treponema: positive  RPR: negative  Treponema antibody: (TPPA): positive  Patient was adequately treated in  (documented above)   Reviewed these lab results with Dr. Tellez and Joellen, with hx of syphilis.  No further f/u needed at this time.      17 Anti-trep: positive  RPR: weakly reactive  Titer: 1   Will send chart to ID for recommendation but will plan for monthly titers for now.          Labor and delivery, indication for care 10/27/2019     Priority: Medium     Type O blood, Rh negative 2019     Priority: Medium     HSV (herpes simplex virus) infection 2019     Priority: Medium     Prophylaxis in last month.        Normal pregnancy in multigravida 04/10/2019     Priority: Medium     4/10/19 [facility] U/S;  7 3/7 weeks, LORENA 19 [facility] U/S; 18 2/7 weeks LORENA 19, posterior placenta, nl fetal survey        Asthma 2013      Priority: Medium     Postpartum depression 04/24/2012     Priority: Medium     Learning disability 08/18/2011     Priority: Medium     NOTED in transfer records; that patient does not have learning disability, but needs special help in math and reading.  Patient talks slowly, asks for extra time processing or thinking of questions,  Several instructions needed to be repeated several times.  Did not understand some simple questions asked by nurse/MA or CNM.       Learning difficulty 08/18/2011     Priority: Medium     Overview:   Overview:   NOTED in transfer records; that patient does not have learning disability, but needs special help in math and reading.  Patient talks slowly, asks for extra time processing or thinking of questions,  Several instructions needed to be repeated several times.  Did not understand some simple questions asked by nurse/MA or CNM.         INTERVAL HISTORY:  /76   Pulse 73   Temp 97.8  F (36.6  C) (Oral)   Resp 18   LMP 02/11/2019   SpO2 99%   Breastfeeding? Unknown     Pt stable, baby is rooming in  Breast feeding status:breastfeeding with formula supplementation per medical reason  Complications since 2 hours post delivery: None  Patient is tolerating acitivity well Voiding without difficulty, cramping is relieved by Ibuprophen, lochia is decreasing and patient denies clots.  Perineal pain is is minimal.  The perineum is intact    Normal postpartum exam,   Patient Active Problem List   Diagnosis     History of syphilis     Learning disability     Sudden infant death syndrome     Postpartum depression     Asthma     Learning difficulty     Normal pregnancy in multigravida     Type O blood, Rh negative     HSV (herpes simplex virus) infection     Labor and delivery, indication for care       Postpartum hemoglobin   Hemoglobin   Date Value Ref Range Status   10/28/2019 11.1 (L) 11.7 - 15.7 g/dL Final     Blood type   Lab Results   Component Value Date    ABO O 10/27/2019     ABO O 10/27/2019       Lab Results   Component Value Date    RH Neg 10/27/2019    RH Neg 10/27/2019     Rubella status   Lab Results   Component Value Date    RUBELLAABIGG immune 2011       ASSESSMENT/PLAN:  Stable Post-partum day #2  Complications: Birth 36w1d   Plan d/c home today  RTC 6 weeks  Teaching done: D/C Instructions: Nutrition/Activity, Engorgement Management, Birth Control Options, Warning Signs/When to Call: Excessive Bleeding, Infection, PP Depression, Kegals and Crunches, RTC Clinic for PP Appointment and PNV    Postpartum warning s/s reviewed, including bleeding/clots, fever, mastitis, or depression    Birthcontrol planned:Combined hormonal method (pills, patch, or ring) and pt does not have a prescription or as d/c meds  Current Discharge Medication List      START taking these medications    Details   acetaminophen (TYLENOL) 325 MG tablet Take 2 tablets (650 mg) by mouth every 6 hours as needed for mild pain Start after Delivery.  Qty: 1 Bottle, Refills: 0    Associated Diagnoses:  (normal spontaneous vaginal delivery)      ibuprofen (ADVIL/MOTRIN) 600 MG tablet Take 1 tablet (600 mg) by mouth every 6 hours as needed for moderate pain Start after delivery  Qty: 60 tablet, Refills: 0    Associated Diagnoses:  (normal spontaneous vaginal delivery)      senna-docusate (SENOKOT-S/PERICOLACE) 8.6-50 MG tablet Take 1 tablet by mouth daily Start after delivery.  Qty: 30 tablet, Refills: 0    Associated Diagnoses:  (normal spontaneous vaginal delivery)         CONTINUE these medications which have NOT CHANGED    Details   Prenatal Vit-Fe Fumarate-FA (PRENATAL VITAMIN) 27-0.8 MG TABS Take 1 tablet by mouth         STOP taking these medications       acyclovir (ZOVIRAX) 400 MG tablet Comments:   Reason for Stopping:

## 2019-10-29 NOTE — PROGRESS NOTES
Pt stable and declining breastfeeding plan is for formula bottle feed. Care transferred to RN jesus MANNING

## 2019-10-29 NOTE — CONSULTS
"[facility]  MATERNAL CHILD HEALTH   SOCIAL WORK PROGRESS NOTE      DATA:     SW consulted to meet with family to discuss community resources, mental health concerns.    INTERVENTION:       SW met with pt in the setting of her postpartum inpatient room.  She is accompanied by her partner, and they are planning to discharge soon.      Writer assessed for community resource needs    Provided information about [place] and process for obtaining one from health insurance.      Assessed for mental heatlh concerns.    Provided PPSM postcard.    ASSESSMENT:     Pt and partner are polite, but brief with SW.  They report that their only need is for car seat resources.  FOB states that he was planning to get a car seat on Friday, but baby came early, so they do not have one.  Dad reports, \"We will figure it out,\" when told by SW that the hospital does not have car seats. They decline resources for obtaining one through insurance, as this process takes several weeks.    Family is aware that they can travel with the infant without a car seat if they are to use public transportation.  Writer encouraged them to borrow a car seat from a friend until they are able to get a seat as planned on Friday.    Pt expresses minimal interest in resources for postpartum mental health, but does accept brochure for Pregnancy and Postpartum Support [place].    PLAN:     Family reports that they have no other concerns for SW at this time.  Please reconsult if additional needs arise prior to discharge later today.    TODD Hyman, [facility]   Social Worker  Maternal Child Health  [facility]  Direct: [phone]  Pager: [phone]        "

## 2019-10-29 NOTE — PLAN OF CARE
Post partum assessment WNLs. Denies pain. Bottle feeding this shift, declines breastfeeding. FOB at bedside, supportive. Family bonding as expected. Stable post partum mom. Appears to be resting between feedings. Continue with plan of care.

## 2019-10-29 NOTE — PLAN OF CARE
VSS. Postpartum check WDL. Pain managed with Ibuprofen. Up ad garry. Tolerating regular diet. Voiding without difficulty.  Had BM this AM and declined stool softener. Significant other at bedside and supportive. Bonding well with baby. Plans to discharge to home this evening. Still needs rhogam.

## 2019-10-29 NOTE — PLAN OF CARE
The patient has been stable. She has declined breastfeeding this evening, due to family visiting, and said she would ask for help when she is ready. She pumped and gave baby some expressed milk in addition to formula. Support as needed.

## 2019-10-30 ENCOUNTER — TELEPHONE (OUTPATIENT)
Dept: MIDWIFE SERVICES | Facility: CLINIC | Age: 24
End: 2019-10-30

## 2019-10-30 DIAGNOSIS — Z29.13 NEED FOR RHOGAM DUE TO RH NEGATIVE MOTHER: Primary | ICD-10-CM

## 2019-10-30 NOTE — PROGRESS NOTES
"CNM visit;  Patient being discharged now, infant in NICU.  Patient crying stating \"I just want to go home and rest, I will be back early tomorrow\"  Pt breastfeeding.    Pt very concerned and crying stating \"I don't want this to be like Keith\" Pt 1st child  of SIDS and pt is very nervous tonight.     Discharge home    Infant in NICU  Pt told may need to see CNM in 2 weeks for mood check.  Enc. Breastfeeding.   Geraldine Chandler CNM    "

## 2019-10-30 NOTE — TELEPHONE ENCOUNTER
"Call from [facility] in regards to patient need for Rhogam due to Rh Positive.      Pt received Rhogam prenatal on 9/6/19.    Patient called and pt related that she did get \"Rhogam in my hip before I left\".    Charge nurse on [facility] called RN caring for pt yesterday and RN confirmed she gave Rhjogam and entered it \"manually\" so that may not be showing in record.    Due to these 2 consistant recollections of the situation feel confident the patient received Rhogam.       Zeus Lindsey APRN, CNM    "

## 2019-10-30 NOTE — PLAN OF CARE
The patient's vitals are stable and she is ready for discharge. Discharge instructions were reviewed. Discharge medications were given. She was given a Spectra breast pump, per request. Her baby's birth certificate was turned in today, and she will plan to do the ROP paperwork independently after discharge. All questions were answered. Baby was transferred to the NICU today at 1520, due to temperature instability. The mother was offered to use her room, 7133, as a boarding room tonight, but she declined as she would like to go home to care for her older child. She is pumping and will visit baby in the NICU, but she has been discharged to home.

## 2019-10-31 LAB
ABO + RH BLD: NORMAL
ABO + RH BLD: NORMAL
BLOOD BANK CMNT PATIENT-IMP: NORMAL
DATE RH IMM GL GVN: NORMAL
FETAL CELL SCN BLD QL ROSETTE: NORMAL
RH IG VIALS RECOM PATIENT: NORMAL

## 2019-12-24 ENCOUNTER — MEDICAL CORRESPONDENCE (OUTPATIENT)
Dept: HEALTH INFORMATION MANAGEMENT | Facility: CLINIC | Age: 24
End: 2019-12-24

## 2020-05-19 ENCOUNTER — MEDICAL CORRESPONDENCE (OUTPATIENT)
Dept: HEALTH INFORMATION MANAGEMENT | Facility: CLINIC | Age: 25
End: 2020-05-19

## 2022-05-14 ENCOUNTER — HOSPITAL ENCOUNTER (EMERGENCY)
Facility: CLINIC | Age: 27
Discharge: HOME | End: 2022-05-14
Attending: PHYSICIAN ASSISTANT | Admitting: PHYSICIAN ASSISTANT
Payer: COMMERCIAL

## 2022-05-14 ENCOUNTER — APPOINTMENT (OUTPATIENT)
Dept: GENERAL RADIOLOGY | Facility: CLINIC | Age: 27
End: 2022-05-14
Attending: PHYSICIAN ASSISTANT
Payer: COMMERCIAL

## 2022-05-14 VITALS
HEIGHT: 61 IN | TEMPERATURE: 98.1 F | OXYGEN SATURATION: 98 % | WEIGHT: 205 LBS | SYSTOLIC BLOOD PRESSURE: 141 MMHG | DIASTOLIC BLOOD PRESSURE: 75 MMHG | RESPIRATION RATE: 16 BRPM | BODY MASS INDEX: 38.71 KG/M2 | HEART RATE: 72 BPM

## 2022-05-14 DIAGNOSIS — M25.571 RIGHT ANKLE PAIN, UNSPECIFIED CHRONICITY: ICD-10-CM

## 2022-05-14 PROCEDURE — 73610 X-RAY EXAM OF ANKLE: CPT | Mod: RT

## 2022-05-14 PROCEDURE — 99283 EMERGENCY DEPT VISIT LOW MDM: CPT

## 2022-05-14 ASSESSMENT — ENCOUNTER SYMPTOMS
COLOR CHANGE: 0
ARTHRALGIAS: 1

## 2022-05-14 NOTE — Clinical Note
Roberto Caro was seen and treated in our emergency department on 5/14/2022.  She may return to work on 05/15/2022.  Roberto can return to work but only can stand half of the time until 5/18.  If she is still sore after then, she needs to see [facility] orthopedics for repeat evaluation.     If you have any questions or concerns, please don't hesitate to call.      Carlos Hdz PA-C

## 2022-05-15 NOTE — ED PROVIDER NOTES
History     Chief Complaint:  Ankle Pain       HPI   Roberto Caro is a 27 year old female presents emergency room today for evaluation of ankle pain that has been going on for 2 weeks.  She says that it becomes very severe at work and its localized to her right anterior ankle.  No injury.  No redness, swelling of her leg, changes in sensation of her foot, or other complaints.  It resolves when she rests and comes home.    ROS:  Review of Systems   Cardiovascular: Negative for leg swelling.   Musculoskeletal: Positive for arthralgias. Negative for gait problem.   Skin: Negative for color change and pallor.      All other systems reviewed and are negative.    Allergies:  No Known Allergies     Medications:    Prenatal Vit-Fe Fumarate-FA (PRENATAL VITAMIN) 27-0.8 MG TABS        Past Medical History:    Past Medical History:   Diagnosis Date     Postpartum depression 4/24/2012     Rh incompatibility      SIDS (sudden infant death syndrome)      Patient Active Problem List   Diagnosis     History of syphilis     Learning disability     Sudden infant death syndrome     Postpartum depression     Asthma     Learning difficulty     Normal pregnancy in multigravida     Type O blood, Rh negative     HSV (herpes simplex virus) infection     Labor and delivery, indication for care        Past Surgical History:    Past Surgical History:   Procedure Laterality Date     LAPAROSCOPIC TUBAL DYE STUDY Bilateral 9/17/2014    Procedure: LAPAROSCOPIC TUBAL DYE STUDY;  Surgeon: Linda Vasquez MD;  Location: [facility] OR     LAPAROSCOPY OPERATIVE ADULT N/A 9/17/2014    Procedure: LAPAROSCOPY OPERATIVE ADULT;  Surgeon: Linda Vasquez MD;  Location: [facility] OR     REMOVE INTRAUTERINE DEVICE N/A 9/17/2014    Procedure: REMOVE INTRAUTERINE DEVICE;  Surgeon: Linda Vasquez MD;  Location: [facility] OR        Family History:    family history includes Alzheimer Disease in her maternal grandfather; Arthritis in her maternal aunt  "and maternal grandmother; Asthma in her brother; Cardiovascular in her maternal grandfather; Diabetes in her maternal grandfather and maternal grandmother; Hypertension in her maternal grandfather, maternal grandmother, and mother; Kidney Disease in her father; Pneumonia in her son.    Social History:   reports that she has never smoked. She has never used smokeless tobacco. She reports that she does not drink alcohol and does not use drugs.  PCP: No Ref-Primary, Physician     Physical Exam     Patient Vitals for the past 24 hrs:   BP Temp Temp src Pulse Resp SpO2 Height Weight   05/14/22 2110 (!) 141/75 98.1  F (36.7  C) Oral 72 16 98 % 1.549 m (5' 1\") 93 kg (205 lb)        Physical Exam  General: Well appearing, pleasant female, resting on exam bed  HEENT: No evidence of trauma.  Conjunctive are clear. Neck range of motion intact.  Nose and throat clear.  Respiratory: Good effort  Cardiovascular: Good distal perfusion  Gastrointestinal: Nondistended  Musculoskeletal: Atraumatic  Skin: Exposed skin clear.  Neurologic: Alert.  Psych:  Patient is cooperative, with normal affect.  Right ankle: She has mild tenderness anteriorly.  Range of motion is intact with good strength.  Good sensation and cap refill.    Emergency Department Course   ECG:  none    Imaging:  Ankle XR, G/E 3 views, right   Final Result   IMPRESSION: Normal joint spaces and alignment. No fracture.           Laboratory:  Labs Ordered and Resulted from Time of ED Arrival to Time of ED Departure - No data to display     Interventions:  Medications - No data to display     Impression & Plan      Medical Decision Making:  Roberto Caro is a 27 year old female presents emergency room today for evaluation of intermittent ankle pain for 2 weeks.  See HPI.  Her vitals are unremarkable.  She has a reassuring exam as above.  Radiographs negative for any bony abnormality.  Her pain is worse after standing all day and improves when she rests.  There is no " signs or symptoms of DVT, ischemic limb, septic arthritis, fracture, or other.  Symptomatic care is indicated.  She needs to rest and get new shoes.  She is to return with new or worsening symptoms.  Follow-up with [facility] orthopedic should she fail to improve.  No further questions and agrees with plan.    Diagnosis:    ICD-10-CM    1. Right ankle pain, unspecified chronicity  M25.571         Discharge Medications:  New Prescriptions    No medications on file        5/14/2022   Carlos Hdz PA-C Cyr, Matthew R, PA-C  05/14/22 2215

## 2022-05-15 NOTE — ED TRIAGE NOTES
Right ankle pain, worse when standing on it, for about two weeks, no trauma, also has some swelling.     Triage Assessment     Row Name 05/14/22 [id number]       Triage Assessment (Adult)    Airway WDL WDL       Respiratory WDL    Respiratory WDL WDL       Skin Circulation/Temperature WDL    Skin Circulation/Temperature WDL WDL       Cardiac WDL    Cardiac WDL WDL       Peripheral/Neurovascular WDL    Peripheral Neurovascular WDL WDL       Cognitive/Neuro/Behavioral WDL    Cognitive/Neuro/Behavioral WDL WDL

## 2025-01-14 NOTE — PROGRESS NOTES
35w3d  Roberto is here by herself today. She is describing some virginia currie contractions and intermittent sharp pain in her cervix. Having some lower back pain, hot baths and massage are helpful. GBS and hgb today. Ordered HSV prophylaxis to start at 36w and discussed importance of avoiding a breakout with Roberto. Flu shot done today. Return to care 1 week.  Pascual Corona CNM       no